# Patient Record
Sex: MALE | Race: WHITE | NOT HISPANIC OR LATINO | Employment: OTHER | URBAN - METROPOLITAN AREA
[De-identification: names, ages, dates, MRNs, and addresses within clinical notes are randomized per-mention and may not be internally consistent; named-entity substitution may affect disease eponyms.]

---

## 2017-03-15 ENCOUNTER — GENERIC CONVERSION - ENCOUNTER (OUTPATIENT)
Dept: OTHER | Facility: OTHER | Age: 82
End: 2017-03-15

## 2017-05-08 ENCOUNTER — ALLSCRIPTS OFFICE VISIT (OUTPATIENT)
Dept: OTHER | Facility: OTHER | Age: 82
End: 2017-05-08

## 2017-05-09 ENCOUNTER — GENERIC CONVERSION - ENCOUNTER (OUTPATIENT)
Dept: OTHER | Facility: OTHER | Age: 82
End: 2017-05-09

## 2017-09-21 ENCOUNTER — GENERIC CONVERSION - ENCOUNTER (OUTPATIENT)
Dept: OTHER | Facility: OTHER | Age: 82
End: 2017-09-21

## 2018-01-13 VITALS
SYSTOLIC BLOOD PRESSURE: 138 MMHG | WEIGHT: 204.13 LBS | DIASTOLIC BLOOD PRESSURE: 70 MMHG | BODY MASS INDEX: 27.05 KG/M2 | HEIGHT: 73 IN

## 2018-06-21 PROBLEM — I10 HYPERTENSION: Status: ACTIVE | Noted: 2017-05-08

## 2018-06-22 ENCOUNTER — OFFICE VISIT (OUTPATIENT)
Dept: INTERNAL MEDICINE CLINIC | Facility: CLINIC | Age: 83
End: 2018-06-22
Payer: COMMERCIAL

## 2018-06-22 VITALS
SYSTOLIC BLOOD PRESSURE: 132 MMHG | DIASTOLIC BLOOD PRESSURE: 68 MMHG | OXYGEN SATURATION: 98 % | BODY MASS INDEX: 27.2 KG/M2 | WEIGHT: 205.2 LBS | HEIGHT: 73 IN | HEART RATE: 68 BPM

## 2018-06-22 DIAGNOSIS — Z95.2 STATUS POST MECHANICAL AORTIC VALVE REPLACEMENT: ICD-10-CM

## 2018-06-22 DIAGNOSIS — E78.5 HYPERLIPIDEMIA, UNSPECIFIED HYPERLIPIDEMIA TYPE: ICD-10-CM

## 2018-06-22 DIAGNOSIS — R10.11 RIGHT UPPER QUADRANT PAIN: ICD-10-CM

## 2018-06-22 DIAGNOSIS — I49.1 ATRIAL PREMATURE COMPLEX: ICD-10-CM

## 2018-06-22 DIAGNOSIS — I10 HYPERTENSION, UNSPECIFIED TYPE: Primary | ICD-10-CM

## 2018-06-22 DIAGNOSIS — Z00.00 WELL ADULT EXAM: ICD-10-CM

## 2018-06-22 PROCEDURE — 3078F DIAST BP <80 MM HG: CPT | Performed by: INTERNAL MEDICINE

## 2018-06-22 PROCEDURE — 1101F PT FALLS ASSESS-DOCD LE1/YR: CPT | Performed by: INTERNAL MEDICINE

## 2018-06-22 PROCEDURE — G0439 PPPS, SUBSEQ VISIT: HCPCS | Performed by: INTERNAL MEDICINE

## 2018-06-22 PROCEDURE — 3075F SYST BP GE 130 - 139MM HG: CPT | Performed by: INTERNAL MEDICINE

## 2018-06-22 RX ORDER — LISINOPRIL 10 MG/1
1 TABLET ORAL DAILY
COMMUNITY
Start: 2017-05-08

## 2018-06-22 RX ORDER — PRASTERONE (DHEA) 25 MG
CAPSULE ORAL
COMMUNITY
End: 2022-03-26 | Stop reason: HOSPADM

## 2018-06-22 RX ORDER — ALPHA LIPOIC ACID 300 MG
CAPSULE ORAL
COMMUNITY
End: 2021-04-20

## 2018-06-22 NOTE — PROGRESS NOTES
Assessment/Plan   Problem List Items Addressed This Visit     Hypertension - Primary    Relevant Medications    lisinopril (ZESTRIL) 10 mg tablet    Other Relevant Orders    Comprehensive metabolic panel    CBC and differential    Lipid Panel with Direct LDL reflex    UA (URINE) with reflex to Microscopic    Hyperlipidemia    Relevant Orders    Comprehensive metabolic panel    CBC and differential    Lipid Panel with Direct LDL reflex    UA (URINE) with reflex to Microscopic    Atrial premature complex    Relevant Orders    Comprehensive metabolic panel    CBC and differential    Lipid Panel with Direct LDL reflex    UA (URINE) with reflex to Microscopic    Status post mechanical aortic valve replacement      Other Visit Diagnoses     Well adult exam        Right upper quadrant pain          Prevention:  Ophthalmological exams are up-to-date with previous cataract surgery with corrective lenses with good vision with glasses  He is wearing hearing aids with adequate hearing  He is due for dermatology exam and will make his own appointment  Today's exam was unremarkable  He has had Prevnar and Pneumovax, as well as Zostavax  He has flu shot every year  He is due for Adacel but there is no coverage and explained the has a break in the skin to contact the office for vaccination  The new shingles vaccine was recommended and he will call as local pharmacy for vaccination series  Lab work was ordered and he will be notified of results  We discussed that he is at an age were colonoscopy would be low benefit and elevated risk so no further colonoscopy is necessary  Recurrent right upper quadrant burning pain radiating to right scapula, occurring with any large meal in the last month or 2, with initial symptoms 15 years ago with hiatus until recently  He had no formal workup and symptoms could be from atypical reflux, possibly gastritis, but certainly gallbladder disease needs to be considered    I did recommend evaluation by General surgery and he was referred  Discussed that he has a crisis whereby his severe unrelenting pain, vomiting, or any other acute turn for the worse in this regard, to go the emergency room immediately  We discussed that he does have gallbladder disease, elective surgery is much preferable to waiting for crisis which would increased risk of complications  He saw his cardiologist for his annual checkup status post aortic valve replacement, and this occurred within the last month, and exam apparently was unremarkable  He has no cardiac symptoms on review  Subjective   Patient ID: Radha Hernandez is a 80 y o  male  Vitals:    06/22/18 1530   BP: 132/68   Pulse: 68   SpO2: 98%     HPI   Trish Waldrop is here for his annual preventive exam   He feels well and is a rather amazing gentleman who takes care of her large house, and is ailing  has she has significant medical problems  He has excellent strength and balance, exercises regularly, including long walks, around the house and property, does lifting, has never fallen, has no chest pain or shortness of breath or declining exercise tolerance status post aortic valve replacement and did see his cardiologist recently and had a favorable checkup according to his report today  We discussed preventive therapy and please see assessment and plan  We discussed that he is having daily episodes of burning right upper quadrant pain radiating to the right scapular region, occurring almost with any heavy meal, without nausea, vomiting, relieved by time, occurring consistently an hour to after eating, not waking up at night, no anorexia weight loss, no flank discomfort but there is radiation to the right scapular region is noted above, no melena or blood per rectum or diarrhea  There are no new medicines but he takes a number of supplements    There is no previous history of diagnosis gallbladder disease but he did have similar symptoms about 15 years ago which did not recur until about 2 months ago  There is no history of trauma to the abdomen  Activities of daily living are well preserved, and he feels his quality of life is quite good  Medical conditions were discussed as well  The following portions of the patient's history were reviewed and updated as appropriate: allergies, current medications, past family history, past medical history, past social history, past surgical history and problem list     Review of Systems no anorexia weight loss, no nausea or vomiting, no diarrhea, no melena or blood per rectum  No change in chronic 1 time per night nocturia  Objective   Physical Exam   Vital signs stable, regular pulse in no distress  He appears younger than stated age with excellent strength and balance  Cognitive status is intact  Hearing with hearing aids in place is slightly impaired  Vision grossly normal glasses  HEENT exam also otherwise notable for normal external auditory canals and tympanic membranes  Nasal passages, oral cavity and throat normal   Eyes without iritis or conjunctivitis  Head neck without mass or adenopathy  Neck supple without trauma and no JVD  No carotid bruits in carotid pulses are normal   There is no transmitted murmur  Lungs are clear  There is no supraclavicular adenopathy  Back without CVA mass or CVA tenderness or kyphosis or scoliosis  Cardiac:  Regular rate rhythm, there is a soft systolic murmur heard throughout the precordium, loudest in mid systole heard best over the aortic area, approximately 1/6 with a mechanical click audible with a second heart sound  There is no S4 or S3  There is no rub  PMI fifth intercostal space midclavicular line  Abdomen:  Normal bowel sounds, nondistended, scaphoid abdomen without periumbilical adenopathy or hernia, soft, and there is very mild tenderness with Vicente's sign, no palpable mass, no organomegaly  Skin without pallor or icterus    Skin well hydrated  Skin without rash or malignancy  Joints demonstrate age-appropriate arthritic degeneration with good function overall  Strength and balance excellent  Neurologic exam shows no focal neurologic abnormalities  Peripheral circulation intact with no change in chronic mild edema with mild varicosities of lower legs without phlebitic findings are calf tenderness        Patient Active Problem List   Diagnosis    Venous insufficiency    Hypertension    Hyperlipidemia    Erectile dysfunction of non-organic origin    Atrial premature complex    Status post mechanical aortic valve replacement     Current Outpatient Prescriptions:     Alpha-Lipoic Acid 300 MG CAPS, Take by mouth, Disp: , Rfl:     CITRUS BIOFLAVONOIDS PO, Take by mouth, Disp: , Rfl:     Coenzyme Q10 (COQ-10) 100 MG CAPS, Take by mouth, Disp: , Rfl:     DHEA 25 MG CAPS, Take by mouth, Disp: , Rfl:     Ferrous Sulfate Dried 200 (65 Fe) MG TABS, Take by mouth, Disp: , Rfl:     lisinopril (ZESTRIL) 10 mg tablet, Take 1 tablet by mouth daily, Disp: , Rfl:     TURMERIC CURCUMIN PO, Take by mouth, Disp: , Rfl:     aspirin 81 MG tablet, Take by mouth, Disp: , Rfl:

## 2018-06-22 NOTE — PATIENT INSTRUCTIONS
We will contact you with the results of your lab work  Please follow-up with Dr Gene Schofield  Go to the emergency room immediately for any sudden crisis regarding her abdominal symptoms  Your next visit will be in 1 year barring problems sooner  Please call your local pharmacy for the shingles vaccine

## 2018-11-16 ENCOUNTER — IMMUNIZATION (OUTPATIENT)
Dept: INTERNAL MEDICINE CLINIC | Facility: CLINIC | Age: 83
End: 2018-11-16
Payer: COMMERCIAL

## 2018-11-16 VITALS — TEMPERATURE: 97.7 F

## 2018-11-16 DIAGNOSIS — Z23 FLU VACCINE NEED: Primary | ICD-10-CM

## 2018-11-16 PROCEDURE — 90662 IIV NO PRSV INCREASED AG IM: CPT

## 2018-11-16 PROCEDURE — 90471 IMMUNIZATION ADMIN: CPT

## 2019-11-15 ENCOUNTER — IMMUNIZATIONS (OUTPATIENT)
Dept: INTERNAL MEDICINE CLINIC | Facility: CLINIC | Age: 84
End: 2019-11-15
Payer: COMMERCIAL

## 2019-11-15 DIAGNOSIS — Z23 NEED FOR INFLUENZA VACCINATION: Primary | ICD-10-CM

## 2019-11-15 PROCEDURE — G0008 ADMIN INFLUENZA VIRUS VAC: HCPCS

## 2019-11-15 PROCEDURE — 90662 IIV NO PRSV INCREASED AG IM: CPT

## 2019-12-02 ENCOUNTER — OFFICE VISIT (OUTPATIENT)
Dept: INTERNAL MEDICINE CLINIC | Facility: CLINIC | Age: 84
End: 2019-12-02
Payer: COMMERCIAL

## 2019-12-02 VITALS
HEART RATE: 75 BPM | WEIGHT: 201.8 LBS | BODY MASS INDEX: 26.74 KG/M2 | TEMPERATURE: 97.8 F | DIASTOLIC BLOOD PRESSURE: 60 MMHG | SYSTOLIC BLOOD PRESSURE: 142 MMHG | HEIGHT: 73 IN | OXYGEN SATURATION: 97 %

## 2019-12-02 DIAGNOSIS — R13.14 PHARYNGOESOPHAGEAL DYSPHAGIA: ICD-10-CM

## 2019-12-02 DIAGNOSIS — Z00.00 MEDICARE ANNUAL WELLNESS VISIT, SUBSEQUENT: Primary | ICD-10-CM

## 2019-12-02 DIAGNOSIS — I87.2 VENOUS INSUFFICIENCY: ICD-10-CM

## 2019-12-02 DIAGNOSIS — E78.5 HYPERLIPIDEMIA, UNSPECIFIED HYPERLIPIDEMIA TYPE: ICD-10-CM

## 2019-12-02 DIAGNOSIS — Z23 NEED FOR 23-POLYVALENT PNEUMOCOCCAL POLYSACCHARIDE VACCINE: ICD-10-CM

## 2019-12-02 DIAGNOSIS — Z95.2 STATUS POST MECHANICAL AORTIC VALVE REPLACEMENT: ICD-10-CM

## 2019-12-02 DIAGNOSIS — I10 HYPERTENSION, UNSPECIFIED TYPE: ICD-10-CM

## 2019-12-02 DIAGNOSIS — I10 HYPERTENSION, UNSPECIFIED TYPE: Primary | ICD-10-CM

## 2019-12-02 DIAGNOSIS — I49.1 ATRIAL PREMATURE COMPLEX: ICD-10-CM

## 2019-12-02 DIAGNOSIS — Z23 NEED FOR TETANUS, DIPHTHERIA, AND ACELLULAR PERTUSSIS (TDAP) VACCINE IN PATIENT OF ADOLESCENT AGE OR OLDER: ICD-10-CM

## 2019-12-02 PROCEDURE — 1160F RVW MEDS BY RX/DR IN RCRD: CPT | Performed by: INTERNAL MEDICINE

## 2019-12-02 PROCEDURE — 90715 TDAP VACCINE 7 YRS/> IM: CPT | Performed by: INTERNAL MEDICINE

## 2019-12-02 PROCEDURE — 90472 IMMUNIZATION ADMIN EACH ADD: CPT | Performed by: INTERNAL MEDICINE

## 2019-12-02 PROCEDURE — 1036F TOBACCO NON-USER: CPT | Performed by: INTERNAL MEDICINE

## 2019-12-02 PROCEDURE — 1101F PT FALLS ASSESS-DOCD LE1/YR: CPT | Performed by: INTERNAL MEDICINE

## 2019-12-02 PROCEDURE — 99213 OFFICE O/P EST LOW 20 MIN: CPT | Performed by: INTERNAL MEDICINE

## 2019-12-02 PROCEDURE — 90732 PPSV23 VACC 2 YRS+ SUBQ/IM: CPT | Performed by: INTERNAL MEDICINE

## 2019-12-02 PROCEDURE — G0439 PPPS, SUBSEQ VISIT: HCPCS | Performed by: INTERNAL MEDICINE

## 2019-12-02 PROCEDURE — 90471 IMMUNIZATION ADMIN: CPT | Performed by: INTERNAL MEDICINE

## 2019-12-02 NOTE — PATIENT INSTRUCTIONS

## 2019-12-02 NOTE — PROGRESS NOTES
Assessment/Plan:    No problem-specific Assessment & Plan notes found for this encounter  Diagnoses and all orders for this visit:    Medicare annual wellness visit, subsequent    Pharyngoesophageal dysphagia  -     Ambulatory referral to Gastroenterology; Future    Hyperlipidemia, unspecified hyperlipidemia type    Status post mechanical aortic valve replacement    Venous insufficiency    Hypertension, unspecified type    Need for 23-polyvalent pneumococcal polysaccharide vaccine  -     PNEUMOCOCCAL POLYSACCHARIDE VACCINE 23-VALENT =>1YO SQ IM    Need for tetanus, diphtheria, and acellular pertussis (Tdap) vaccine in patient of adolescent age or older  -     TDAP VACCINE GREATER THAN OR EQUAL TO 8YO IM          Subjective:      Patient ID: Cindy Flanagan is a 80 y o  male      HPI    The following portions of the patient's history were reviewed and updated as appropriate: allergies, current medications, past family history, past medical history, past social history, past surgical history and problem list     Review of Systems      Objective:      /60 (BP Location: Left arm, Patient Position: Sitting, Cuff Size: Standard)   Pulse 75   Temp 97 8 °F (36 6 °C) (Tympanic)   Ht 6' 1" (1 854 m)   Wt 91 5 kg (201 lb 12 8 oz)   SpO2 97%   BMI 26 62 kg/m²      Wt Readings from Last 3 Encounters:   12/02/19 91 5 kg (201 lb 12 8 oz)   06/22/18 93 1 kg (205 lb 3 2 oz)   05/08/17 92 6 kg (204 lb 2 1 oz)     Temp Readings from Last 3 Encounters:   12/02/19 97 8 °F (36 6 °C) (Tympanic)   11/16/18 97 7 °F (36 5 °C) (Tympanic)   04/14/15 (!) 96 6 °F (35 9 °C)     BP Readings from Last 3 Encounters:   12/02/19 142/60   06/22/18 132/68   05/08/17 138/70     Pulse Readings from Last 3 Encounters:   12/02/19 75   06/22/18 68   04/14/15 76        Physical Exam      Patient Active Problem List   Diagnosis    Venous insufficiency    Hypertension    Hyperlipidemia    Erectile dysfunction of non-organic origin    Atrial premature complex    Status post mechanical aortic valve replacement       Current Outpatient Medications on File Prior to Visit   Medication Sig Dispense Refill    Alpha-Lipoic Acid 300 MG CAPS Take by mouth      aspirin 81 MG tablet Take by mouth      CITRUS BIOFLAVONOIDS PO Take by mouth      Coenzyme Q10 (COQ-10) 100 MG CAPS Take by mouth      DHEA 25 MG CAPS Take by mouth      lisinopril (ZESTRIL) 10 mg tablet Take 1 tablet by mouth daily      TURMERIC CURCUMIN PO Take by mouth      [DISCONTINUED] Ferrous Sulfate Dried 200 (65 Fe) MG TABS Take by mouth       No current facility-administered medications on file prior to visit  Assessment and Plan:     Problem List Items Addressed This Visit        Cardiovascular and Mediastinum    Venous insufficiency    Hypertension       Other    Hyperlipidemia    Status post mechanical aortic valve replacement      Other Visit Diagnoses     Medicare annual wellness visit, subsequent    -  Primary    Pharyngoesophageal dysphagia        Relevant Orders    Ambulatory referral to Gastroenterology    Need for 23-polyvalent pneumococcal polysaccharide vaccine        Relevant Orders    PNEUMOCOCCAL POLYSACCHARIDE VACCINE 23-VALENT =>3YO SQ IM (Completed)    Need for tetanus, diphtheria, and acellular pertussis (Tdap) vaccine in patient of adolescent age or older        Relevant Orders    TDAP VACCINE GREATER THAN OR EQUAL TO 8YO IM (Completed)           Preventive health issues were discussed with patient, and age appropriate screening tests were ordered as noted in patient's After Visit Summary  Personalized health advice and appropriate referrals for health education or preventive services given if needed, as noted in patient's After Visit Summary       History of Present Illness:     Patient presents for Medicare Annual Wellness visit    Patient Care Team:  Bruno Cameron MD as PCP - General     Problem List:     Patient Active Problem List   Diagnosis    Venous insufficiency    Hypertension    Hyperlipidemia    Erectile dysfunction of non-organic origin    Atrial premature complex    Status post mechanical aortic valve replacement      Past Medical and Surgical History:     Past Medical History:   Diagnosis Date    Acute bacterial prostatitis     last assessed: 5/29/15    Aortic valve stenosis     last assessed: 03/10/15    Coronary atherosclerosis     last assessed: 03/10/15    Left inguinal hernia     last assessed: 03/10/15    Status post laparoscopic hernia repair     last assessed: 04/14/15     Past Surgical History:   Procedure Laterality Date    AORTIC VALVE REPLACEMENT  2008    CATARACT EXTRACTION  2014    INGUINAL HERNIA REPAIR Right       Family History:     Family History   Problem Relation Age of Onset    Leukemia Mother     Coronary artery disease Father     Leukemia Father     Coronary artery disease Other     Leukemia Other       Social History:     Social History     Socioeconomic History    Marital status:      Spouse name: None    Number of children: None    Years of education: None    Highest education level: None   Occupational History    None   Social Needs    Financial resource strain: None    Food insecurity:     Worry: None     Inability: None    Transportation needs:     Medical: None     Non-medical: None   Tobacco Use    Smoking status: Former Smoker     Types: Cigars, Pipe     Last attempt to quit: 2005     Years since quittin 9    Smokeless tobacco: Former User   Substance and Sexual Activity    Alcohol use: Yes     Comment: wine at meals    Drug use: None    Sexual activity: None   Lifestyle    Physical activity:     Days per week: None     Minutes per session: None    Stress: None   Relationships    Social connections:     Talks on phone: None     Gets together: None     Attends Orthodoxy service: None     Active member of club or organization: None     Attends meetings of clubs or organizations: None     Relationship status: None    Intimate partner violence:     Fear of current or ex partner: None     Emotionally abused: None     Physically abused: None     Forced sexual activity: None   Other Topics Concern    None   Social History Narrative    None       Medications and Allergies:     Current Outpatient Medications   Medication Sig Dispense Refill    Alpha-Lipoic Acid 300 MG CAPS Take by mouth      aspirin 81 MG tablet Take by mouth      CITRUS BIOFLAVONOIDS PO Take by mouth      Coenzyme Q10 (COQ-10) 100 MG CAPS Take by mouth      DHEA 25 MG CAPS Take by mouth      lisinopril (ZESTRIL) 10 mg tablet Take 1 tablet by mouth daily      TURMERIC CURCUMIN PO Take by mouth       No current facility-administered medications for this visit  No Known Allergies   Immunizations:     Immunization History   Administered Date(s) Administered    H1N1, All Formulations 01/27/2010    Influenza Split High Dose Preservative Free IM 10/16/2013, 10/08/2014, 11/03/2015, 11/10/2017    Influenza, high dose seasonal 0 5 mL 11/16/2018, 11/15/2019    Pneumococcal Polysaccharide PPV23 12/02/2019    Tdap 12/02/2019      Health Maintenance: There are no preventive care reminders to display for this patient  Topic Date Due    DTaP,Tdap,and Td Vaccines (1 - Tdap) 12/01/1944    Pneumococcal Vaccine: 65+ Years (1 of 2 - PCV13) 12/01/1998      Medicare Health Risk Assessment:     /60 (BP Location: Left arm, Patient Position: Sitting, Cuff Size: Standard)   Pulse 75   Temp 97 8 °F (36 6 °C) (Tympanic)   Ht 6' 1" (1 854 m)   Wt 91 5 kg (201 lb 12 8 oz)   SpO2 97%   BMI 26 62 kg/m²      Chico Solares is here for his Subsequent Wellness visit  Health Risk Assessment:   Patient rates overall health as very good  Patient feels that their physical health rating is same  Eyesight was rated as slightly worse  Hearing was rated as slightly worse   Patient feels that their emotional and mental health rating is same  Pain experienced in the last 7 days has been some  Depression Screening:   PHQ-2 Score: 0      Fall Risk Screening: In the past year, patient has experienced: no history of falling in past year      Home Safety:  Patient has trouble with stairs inside or outside of their home  Patient has working smoke alarms and has working carbon monoxide detector  Home safety hazards include: none  Nutrition:   Current diet is Regular  Medications:   Patient is currently taking over-the-counter supplements  OTC medications include: see medication list  Patient is able to manage medications  Activities of Daily Living (ADLs)/Instrumental Activities of Daily Living (IADLs):   Walk and transfer into and out of bed and chair?: Yes  Dress and groom yourself?: Yes    Bathe or shower yourself?: Yes    Feed yourself? Yes  Do your laundry/housekeeping?: Yes  Manage your money, pay your bills and track your expenses?: Yes  Make your own meals?: Yes    Do your own shopping?: Yes    Previous Hospitalizations:   Any hospitalizations or ED visits within the last 12 months?: No      Advance Care Planning:   Living will: Yes    Durable POA for healthcare:  Yes    Advanced directive: Yes      PREVENTIVE SCREENINGS      Cardiovascular Screening:    General: Screening Not Indicated and History Lipid Disorder      Colorectal Cancer Screening:     General: Screening Not Indicated      Prostate Cancer Screening:    General: Screening Not Indicated      Abdominal Aortic Aneurysm (AAA) Screening:    Risk factors include: tobacco use        Sola Renee MD

## 2019-12-02 NOTE — PROGRESS NOTES
Assessment/Plan:     Diagnoses and all orders for this visit:    Medicare annual wellness visit, subsequent    Pharyngoesophageal dysphagia  -     Ambulatory referral to Gastroenterology; Future    Hyperlipidemia, unspecified hyperlipidemia type    Status post mechanical aortic valve replacement    Venous insufficiency    Hypertension, unspecified type    Need for 23-polyvalent pneumococcal polysaccharide vaccine  -     PNEUMOCOCCAL POLYSACCHARIDE VACCINE 23-VALENT =>1YO SQ IM    Need for tetanus, diphtheria, and acellular pertussis (Tdap) vaccine in patient of adolescent age or older  -     TDAP VACCINE GREATER THAN OR EQUAL TO 8YO IM          Subjective:      Patient ID: Mary Mujica is a 80 y o  male  HPI  Darien Granda is here for an annual preventive exam and was last seen last year  He is a bit overdue for his annual visit with Cardiology, and was going to be notified and did not received notification  He is status post aortic valve replacement and Darien Granda manages his affairs very well, has excellent memory, will make his own appointment with Cardiology  Annual lab work was ordered including reassessment of hyperlipidemia therapy, hypertension is well controlled as blood pressure readings generally are lower than today's  He manages his venous insufficiency by using knee-high support stockings, without phlebitic symptoms and keep sodium intake low  He has chronic dysphagia, about 6 to 8 months, whereby he swallows and solid foods stick at the bottom of the esophagus, before going through spontaneously  He has a history of remote diagnosis of cholecystitis, does not have any true biliary colic  He has no fever, chills, anorexia weight loss, no change in bowel habits and was referred to [address] [place] [address] Gastroenterology  Possibilities would be presbyesophagus, stricture, although there are no symptoms of chronic reflux is still possibility  Immunizations reviewed and updated    Eye care, dental care up-to-date  Skin exam was unremarkable  Medicare questionnaire was also reviewed today as part of his Medicare annual examination  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, and has good social supports, and no deficits of activities of daily living  He can continue follow-up on an annual basis as needed sooner  The following portions of the patient's history were reviewed and updated as appropriate: allergies, current medications, past family history, past medical history, past social history, past surgical history and problem list     Review of Systems  as above and please see the Medicare questionnaire, all others negative  Screening for dementia and depression is negative  Objective:      /60 (BP Location: Left arm, Patient Position: Sitting, Cuff Size: Standard)   Pulse 75   Temp 97 8 °F (36 6 °C) (Tympanic)   Ht 6' 1" (1 854 m)   Wt 91 5 kg (201 lb 12 8 oz)   SpO2 97%   BMI 26 62 kg/m²      Wt Readings from Last 3 Encounters:   12/02/19 91 5 kg (201 lb 12 8 oz)   06/22/18 93 1 kg (205 lb 3 2 oz)   05/08/17 92 6 kg (204 lb 2 1 oz)     Temp Readings from Last 3 Encounters:   12/02/19 97 8 °F (36 6 °C) (Tympanic)   11/16/18 97 7 °F (36 5 °C) (Tympanic)   04/14/15 (!) 96 6 °F (35 9 °C)     BP Readings from Last 3 Encounters:   12/02/19 142/60   06/22/18 132/68   05/08/17 138/70     Pulse Readings from Last 3 Encounters:   12/02/19 75   06/22/18 68   04/14/15 76        Physical Exam    Vital signs stable, very pleasant gentleman in no distress  Normocephalic atraumatic  ENT exam unremarkable  He did remove his hearing aids, and hearing is reasonably corrected with hearing aids in place  Vision grossly normal   Skin without rash or malignancy  Head neck without mass or adenopathy  No JVD  No carotid bruits in carotid pulses are normal   Trachea midline without stridor and thyroid exam is normal   Lungs are clear to auscultation    Cardiac:  Regular rate rhythm, systolic murmur heard across the precordium loudest over the aortic area and transmitted to the carotids with intact aortic component of second heart sound, no S4 or S3 or rub  Abdomen:  Nondistended abdomen with normal bowel sounds, soft and nontender without masses bruits organomegaly and there is no hernia or periumbilical adenopathy  He has excellent skin integrity minimal edema with knee-high stockings in place with excellent peripheral circulation  Joint function is quite good for advanced age which 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 and high use joints  Gait stable and normal   Patient Active Problem List   Diagnosis    Venous insufficiency    Hypertension    Hyperlipidemia    Erectile dysfunction of non-organic origin    Atrial premature complex    Status post mechanical aortic valve replacement       Current Outpatient Medications on File Prior to Visit   Medication Sig Dispense Refill    Alpha-Lipoic Acid 300 MG CAPS Take by mouth      aspirin 81 MG tablet Take by mouth      CITRUS BIOFLAVONOIDS PO Take by mouth      Coenzyme Q10 (COQ-10) 100 MG CAPS Take by mouth      DHEA 25 MG CAPS Take by mouth      lisinopril (ZESTRIL) 10 mg tablet Take 1 tablet by mouth daily      TURMERIC CURCUMIN PO Take by mouth      [DISCONTINUED] Ferrous Sulfate Dried 200 (65 Fe) MG TABS Take by mouth       No current facility-administered medications on file prior to visit

## 2020-01-23 ENCOUNTER — APPOINTMENT (OUTPATIENT)
Dept: LAB | Facility: HOSPITAL | Age: 85
End: 2020-01-23
Payer: COMMERCIAL

## 2020-01-23 DIAGNOSIS — I10 HYPERTENSION, UNSPECIFIED TYPE: ICD-10-CM

## 2020-01-23 LAB
ALBUMIN SERPL BCP-MCNC: 3.9 G/DL (ref 3.5–5)
ALP SERPL-CCNC: 84 U/L (ref 46–116)
ALT SERPL W P-5'-P-CCNC: 27 U/L (ref 12–78)
ANION GAP SERPL CALCULATED.3IONS-SCNC: 6 MMOL/L (ref 4–13)
AST SERPL W P-5'-P-CCNC: 16 U/L (ref 5–45)
BACTERIA UR QL AUTO: ABNORMAL /HPF
BASOPHILS # BLD AUTO: 0.05 THOUSANDS/ΜL (ref 0–0.1)
BASOPHILS NFR BLD AUTO: 1 % (ref 0–1)
BILIRUB SERPL-MCNC: 0.39 MG/DL (ref 0.2–1)
BILIRUB UR QL STRIP: NEGATIVE
BUN SERPL-MCNC: 15 MG/DL (ref 5–25)
CALCIUM SERPL-MCNC: 9 MG/DL (ref 8.3–10.1)
CHLORIDE SERPL-SCNC: 106 MMOL/L (ref 100–108)
CHOLEST SERPL-MCNC: 130 MG/DL (ref 50–200)
CLARITY UR: CLEAR
CO2 SERPL-SCNC: 30 MMOL/L (ref 21–32)
COLOR UR: YELLOW
CREAT SERPL-MCNC: 0.73 MG/DL (ref 0.6–1.3)
EOSINOPHIL # BLD AUTO: 0.39 THOUSAND/ΜL (ref 0–0.61)
EOSINOPHIL NFR BLD AUTO: 6 % (ref 0–6)
ERYTHROCYTE [DISTWIDTH] IN BLOOD BY AUTOMATED COUNT: 13.4 % (ref 11.6–15.1)
GFR SERPL CREATININE-BSD FRML MDRD: 84 ML/MIN/1.73SQ M
GLUCOSE P FAST SERPL-MCNC: 93 MG/DL (ref 65–99)
GLUCOSE UR STRIP-MCNC: NEGATIVE MG/DL
HCT VFR BLD AUTO: 43.8 % (ref 36.5–49.3)
HDLC SERPL-MCNC: 45 MG/DL
HGB BLD-MCNC: 13.8 G/DL (ref 12–17)
HGB UR QL STRIP.AUTO: NEGATIVE
IMM GRANULOCYTES # BLD AUTO: 0.02 THOUSAND/UL (ref 0–0.2)
IMM GRANULOCYTES NFR BLD AUTO: 0 % (ref 0–2)
KETONES UR STRIP-MCNC: NEGATIVE MG/DL
LDLC SERPL CALC-MCNC: 75 MG/DL (ref 0–100)
LEUKOCYTE ESTERASE UR QL STRIP: NEGATIVE
LYMPHOCYTES # BLD AUTO: 1.54 THOUSANDS/ΜL (ref 0.6–4.47)
LYMPHOCYTES NFR BLD AUTO: 22 % (ref 14–44)
MCH RBC QN AUTO: 30.6 PG (ref 26.8–34.3)
MCHC RBC AUTO-ENTMCNC: 31.5 G/DL (ref 31.4–37.4)
MCV RBC AUTO: 97 FL (ref 82–98)
MONOCYTES # BLD AUTO: 0.68 THOUSAND/ΜL (ref 0.17–1.22)
MONOCYTES NFR BLD AUTO: 10 % (ref 4–12)
NEUTROPHILS # BLD AUTO: 4.33 THOUSANDS/ΜL (ref 1.85–7.62)
NEUTS SEG NFR BLD AUTO: 61 % (ref 43–75)
NITRITE UR QL STRIP: NEGATIVE
NON-SQ EPI CELLS URNS QL MICRO: ABNORMAL /HPF
NONHDLC SERPL-MCNC: 85 MG/DL
NRBC BLD AUTO-RTO: 0 /100 WBCS
PH UR STRIP.AUTO: 6 [PH]
PLATELET # BLD AUTO: 189 THOUSANDS/UL (ref 149–390)
PMV BLD AUTO: 9.8 FL (ref 8.9–12.7)
POTASSIUM SERPL-SCNC: 4.5 MMOL/L (ref 3.5–5.3)
PROT SERPL-MCNC: 7.2 G/DL (ref 6.4–8.2)
PROT UR STRIP-MCNC: NEGATIVE MG/DL
RBC # BLD AUTO: 4.51 MILLION/UL (ref 3.88–5.62)
RBC #/AREA URNS AUTO: ABNORMAL /HPF
SODIUM SERPL-SCNC: 142 MMOL/L (ref 136–145)
SP GR UR STRIP.AUTO: 1.02 (ref 1–1.03)
TRIGL SERPL-MCNC: 52 MG/DL
TSH SERPL DL<=0.05 MIU/L-ACNC: 1.89 UIU/ML (ref 0.36–3.74)
UROBILINOGEN UR QL STRIP.AUTO: 0.2 E.U./DL
WBC # BLD AUTO: 7.01 THOUSAND/UL (ref 4.31–10.16)
WBC #/AREA URNS AUTO: ABNORMAL /HPF

## 2020-01-23 PROCEDURE — 80053 COMPREHEN METABOLIC PANEL: CPT | Performed by: INTERNAL MEDICINE

## 2020-01-23 PROCEDURE — 84443 ASSAY THYROID STIM HORMONE: CPT | Performed by: INTERNAL MEDICINE

## 2020-01-23 PROCEDURE — 81001 URINALYSIS AUTO W/SCOPE: CPT | Performed by: INTERNAL MEDICINE

## 2020-01-23 PROCEDURE — 85025 COMPLETE CBC W/AUTO DIFF WBC: CPT | Performed by: INTERNAL MEDICINE

## 2020-01-23 PROCEDURE — 80061 LIPID PANEL: CPT

## 2020-01-23 PROCEDURE — 36415 COLL VENOUS BLD VENIPUNCTURE: CPT | Performed by: INTERNAL MEDICINE

## 2020-01-27 ENCOUNTER — TELEPHONE (OUTPATIENT)
Dept: INTERNAL MEDICINE CLINIC | Facility: CLINIC | Age: 85
End: 2020-01-27

## 2020-01-27 NOTE — TELEPHONE ENCOUNTER
----- Message from Ira Torrez MD sent at 1/24/2020  8:19 AM EST -----  Please call Ralph Cain: his labs look good including a cholesterol of 130 and normal thyroid test

## 2020-02-11 ENCOUNTER — CONSULT (OUTPATIENT)
Dept: GASTROENTEROLOGY | Facility: MEDICAL CENTER | Age: 85
End: 2020-02-11
Payer: COMMERCIAL

## 2020-02-11 VITALS
WEIGHT: 202 LBS | BODY MASS INDEX: 26.77 KG/M2 | HEIGHT: 73 IN | DIASTOLIC BLOOD PRESSURE: 72 MMHG | TEMPERATURE: 97.5 F | SYSTOLIC BLOOD PRESSURE: 124 MMHG

## 2020-02-11 DIAGNOSIS — R13.19 ESOPHAGEAL DYSPHAGIA: ICD-10-CM

## 2020-02-11 PROCEDURE — 99204 OFFICE O/P NEW MOD 45 MIN: CPT | Performed by: INTERNAL MEDICINE

## 2020-02-14 NOTE — PROGRESS NOTES
[address] [facility] - Outpatient Consultation  Heather Tolliver 80 y o  male MRN: [id number]  Encounter: [id number]      PCP: Anushka Rader MD  Referring: Anushka Rader MD  [address]  [facility] [address]      ASSESSMENT AND PLAN:      1  Esophageal dysphagia  Intermittent dysphagia, to both solids and liquids, given his age and lack of weight loss is most consistent with presbyesophagus  Although at this time I cannot rule out Schatzki's ring versus peptic stricture versus malignancy (although this is less likely given his lack of weight loss)  I discussed ordering a barium swallow to evaluate for esophageal dysmotility, as well as EGD to evaluate for the above differential and with potential to intervene   the patient prefers to defer EGD at this time, but will undergo barium swallow which will also help give us some information regarding structural abnormalities and may be present  PPI can also be considered as it has shown benefit in some symptoms of dysphagia where underlying subclinical reflux is present, he also defers this  - Ambulatory referral to Gastroenterology  - [place] barium swallow; Future      ______________________________________________________________________    HPI:      Patient is an 58-year-old male referred for dysphagia  He has HTN, venous insufficiency, coronary atherosclerosis, PVCs, aortic valve stenosis status post mechanical aortic valve replacement not on anticoagulation, history of hernia repair, HLD, ED  He complains of a one year history of dysphagia, with food being hung up at his epigastrium, not passing through his stomach  He states that his symptoms have been intermittent, with both solids and liquids  He describes solids of solids, chicken, fish getting stuck  He has attempted multiple home remedies including bijal, almonds, peppermint, sitting up straight, and moving his arm, which is significantly improved his symptoms    He denies any unintentional weight loss or reflux symptoms  He has never previously had an endoscopy  He does state that he had been up-to-date with his colonoscopies, and was not recommended for any further given his age  He denies any rectal bleeding, abdominal pain  REVIEW OF SYSTEMS:    CONSTITUTIONAL: Denies any fever, chills, rigors, and weight loss  HEENT: No earache or tinnitus  Denies hearing loss or visual disturbances  CARDIOVASCULAR: No chest pain or palpitations  RESPIRATORY: Denies any cough, hemoptysis, shortness of breath or dyspnea on exertion  GASTROINTESTINAL: As noted in the History of Present Illness  GENITOURINARY: No problems with urination  Denies any hematuria or dysuria  NEUROLOGIC: No dizziness or vertigo, denies headaches  MUSCULOSKELETAL: Denies any muscle or joint pain  SKIN: Denies skin rashes or itching  ENDOCRINE: Denies excessive thirst  Denies intolerance to heat or cold  PSYCHOSOCIAL: Denies depression or anxiety  Denies any recent memory loss         Historical Information   Past Medical History:   Diagnosis Date    Acute bacterial prostatitis     last assessed: 5/29/15    Aortic valve stenosis     last assessed: 03/10/15    Coronary atherosclerosis     last assessed: 03/10/15    Left inguinal hernia     last assessed: 03/10/15    Status post laparoscopic hernia repair     last assessed: 04/14/15     Past Surgical History:   Procedure Laterality Date    AORTIC VALVE REPLACEMENT  02/2008    CATARACT EXTRACTION  09/08/2014    INGUINAL HERNIA REPAIR Right      Social History   Social History     Substance and Sexual Activity   Alcohol Use Yes    Comment: wine at meals     Social History     Substance and Sexual Activity   Drug Use Not on file     Social History     Tobacco Use   Smoking Status Former Smoker    Types: Cigars, Pipe    Last attempt to quit: 1/1/2005    Years since quitting: 15 1   Smokeless Tobacco Former User     Family History   Problem Relation Age of Onset    Leukemia Mother     Coronary artery disease Father     Leukemia Father     Coronary artery disease Other     Leukemia Other        Meds/Allergies       Current Outpatient Medications:     Alpha-Lipoic Acid 300 MG CAPS    aspirin 81 MG tablet    CITRUS BIOFLAVONOIDS PO    Coenzyme Q10 (COQ-10) 100 MG CAPS    DHEA 25 MG CAPS    lisinopril (ZESTRIL) 10 mg tablet    TURMERIC CURCUMIN PO    No Known Allergies        Objective     Blood pressure 124/72, temperature 97 5 °F (36 4 °C), temperature source Tympanic, height 6' 1" (1 854 m), weight 91 6 kg (202 lb)  Body mass index is 26 65 kg/m²  PHYSICAL EXAM:      General Appearance:   Alert, cooperative, no distress   HEENT:   Normocephalic, atraumatic, anicteric      Neck:  Supple, symmetrical, trachea midline   Lungs:   Clear to auscultation bilaterally; no rales, rhonchi or wheezing; respirations unlabored    Heart[de-identified]   + murmur   Abdomen:   Soft, non-tender, non-distended; normal bowel sounds; no masses, no organomegaly    Genitalia:   Deferred    Rectal:   Deferred    Extremities:  No cyanosis, clubbing or edema    Pulses:  2+ and symmetric    Skin:  No jaundice, rashes, or lesions    Lymph nodes:  No palpable cervical lymphadenopathy        Lab Results:     Lab Results   Component Value Date    WBC 7 01 01/23/2020    HGB 13 8 01/23/2020    HCT 43 8 01/23/2020    MCV 97 01/23/2020     01/23/2020       Lab Results   Component Value Date     03/24/2015    K 4 5 01/23/2020     01/23/2020    CO2 30 01/23/2020    ANIONGAP 7 03/24/2015    BUN 15 01/23/2020    CREATININE 0 73 01/23/2020    GLUCOSE 100 03/24/2015    GLUF 93 01/23/2020    CALCIUM 9 0 01/23/2020    AST 16 01/23/2020    ALT 27 01/23/2020    ALKPHOS 84 01/23/2020    EGFR 84 01/23/2020       No results found for: INR, PROTIME      Radiology Results:   No results found

## 2020-02-17 ENCOUNTER — TELEPHONE (OUTPATIENT)
Dept: GASTROENTEROLOGY | Facility: MEDICAL CENTER | Age: 85
End: 2020-02-17

## 2020-02-17 NOTE — TELEPHONE ENCOUNTER
Patient called stating his barium swallow needs a prior auth per his insurance  Called patients insurance, no prior auth needed, service is covered  Martin Romero  Ref # [id number]

## 2020-02-25 ENCOUNTER — HOSPITAL ENCOUNTER (OUTPATIENT)
Dept: RADIOLOGY | Facility: HOSPITAL | Age: 85
Discharge: HOME | End: 2020-02-25
Attending: INTERNAL MEDICINE
Payer: COMMERCIAL

## 2020-02-25 DIAGNOSIS — R13.19 ESOPHAGEAL DYSPHAGIA: ICD-10-CM

## 2020-02-25 PROCEDURE — 74220 X-RAY XM ESOPHAGUS 1CNTRST: CPT

## 2020-02-26 ENCOUNTER — TELEPHONE (OUTPATIENT)
Dept: GASTROENTEROLOGY | Facility: MEDICAL CENTER | Age: 85
End: 2020-02-26

## 2020-02-26 NOTE — TELEPHONE ENCOUNTER
----- Message from Tila Conway MD sent at 2/25/2020  8:53 PM EST -----  Please call patient with results - normal swallow study  Would recommend EGD as previously discussed  Please let me know if he is agreeable or wants to await appointment to discuss further

## 2020-03-03 NOTE — TELEPHONE ENCOUNTER
Spoke with patient and discussed result and recommendation   Patient states he would like to wait until his f/u appt to discuss further

## 2020-04-20 ENCOUNTER — TELEPHONE (OUTPATIENT)
Dept: GASTROENTEROLOGY | Facility: MEDICAL CENTER | Age: 85
End: 2020-04-20

## 2020-10-29 ENCOUNTER — IMMUNIZATIONS (OUTPATIENT)
Dept: INTERNAL MEDICINE CLINIC | Facility: CLINIC | Age: 85
End: 2020-10-29
Payer: COMMERCIAL

## 2020-10-29 DIAGNOSIS — Z23 FLU VACCINE NEED: Primary | ICD-10-CM

## 2020-10-29 PROCEDURE — 90662 IIV NO PRSV INCREASED AG IM: CPT

## 2020-10-29 PROCEDURE — G0008 ADMIN INFLUENZA VIRUS VAC: HCPCS

## 2021-01-21 DIAGNOSIS — Z23 ENCOUNTER FOR IMMUNIZATION: ICD-10-CM

## 2021-01-27 ENCOUNTER — IMMUNIZATIONS (OUTPATIENT)
Dept: FAMILY MEDICINE CLINIC | Facility: HOSPITAL | Age: 86
End: 2021-01-27

## 2021-01-27 DIAGNOSIS — Z23 ENCOUNTER FOR IMMUNIZATION: Primary | ICD-10-CM

## 2021-01-27 PROCEDURE — 91301 SARS-COV-2 / COVID-19 MRNA VACCINE (MODERNA) 100 MCG: CPT

## 2021-01-27 PROCEDURE — 0011A SARS-COV-2 / COVID-19 MRNA VACCINE (MODERNA) 100 MCG: CPT

## 2021-02-22 ENCOUNTER — IMMUNIZATIONS (OUTPATIENT)
Dept: FAMILY MEDICINE CLINIC | Facility: HOSPITAL | Age: 86
End: 2021-02-22

## 2021-02-22 DIAGNOSIS — Z23 ENCOUNTER FOR IMMUNIZATION: Primary | ICD-10-CM

## 2021-02-22 PROCEDURE — 91301 SARS-COV-2 / COVID-19 MRNA VACCINE (MODERNA) 100 MCG: CPT

## 2021-02-22 PROCEDURE — 0012A SARS-COV-2 / COVID-19 MRNA VACCINE (MODERNA) 100 MCG: CPT

## 2021-04-20 ENCOUNTER — OFFICE VISIT (OUTPATIENT)
Dept: INTERNAL MEDICINE CLINIC | Facility: CLINIC | Age: 86
End: 2021-04-20
Payer: COMMERCIAL

## 2021-04-20 VITALS
RESPIRATION RATE: 16 BRPM | OXYGEN SATURATION: 98 % | SYSTOLIC BLOOD PRESSURE: 138 MMHG | HEART RATE: 56 BPM | HEIGHT: 73 IN | BODY MASS INDEX: 26.77 KG/M2 | TEMPERATURE: 98.2 F | DIASTOLIC BLOOD PRESSURE: 64 MMHG | WEIGHT: 202 LBS

## 2021-04-20 DIAGNOSIS — Z95.2 STATUS POST MECHANICAL AORTIC VALVE REPLACEMENT: ICD-10-CM

## 2021-04-20 DIAGNOSIS — I87.2 VENOUS INSUFFICIENCY: ICD-10-CM

## 2021-04-20 DIAGNOSIS — K22.89 PRESBYESOPHAGUS: Primary | ICD-10-CM

## 2021-04-20 DIAGNOSIS — Z79.01 CURRENT USE OF LONG TERM ANTICOAGULATION: ICD-10-CM

## 2021-04-20 DIAGNOSIS — I48.3 TYPICAL ATRIAL FLUTTER (HCC): ICD-10-CM

## 2021-04-20 DIAGNOSIS — E78.01 FAMILIAL HYPERCHOLESTEROLEMIA: ICD-10-CM

## 2021-04-20 DIAGNOSIS — I10 ESSENTIAL HYPERTENSION: ICD-10-CM

## 2021-04-20 DIAGNOSIS — Z86.69 HISTORY OF SCIATICA: ICD-10-CM

## 2021-04-20 PROBLEM — I48.92 ATRIAL FLUTTER (HCC): Status: ACTIVE | Noted: 2021-04-20

## 2021-04-20 PROCEDURE — 1101F PT FALLS ASSESS-DOCD LE1/YR: CPT | Performed by: INTERNAL MEDICINE

## 2021-04-20 PROCEDURE — 3725F SCREEN DEPRESSION PERFORMED: CPT | Performed by: INTERNAL MEDICINE

## 2021-04-20 PROCEDURE — 1036F TOBACCO NON-USER: CPT | Performed by: INTERNAL MEDICINE

## 2021-04-20 PROCEDURE — 3288F FALL RISK ASSESSMENT DOCD: CPT | Performed by: INTERNAL MEDICINE

## 2021-04-20 PROCEDURE — 1160F RVW MEDS BY RX/DR IN RCRD: CPT | Performed by: INTERNAL MEDICINE

## 2021-04-20 PROCEDURE — 99214 OFFICE O/P EST MOD 30 MIN: CPT | Performed by: INTERNAL MEDICINE

## 2021-04-20 RX ORDER — FUROSEMIDE 20 MG/1
1 TABLET ORAL DAILY
COMMUNITY
Start: 2021-02-10 | End: 2022-04-04

## 2021-04-20 RX ORDER — RIVAROXABAN 20 MG/1
20 TABLET, FILM COATED ORAL
COMMUNITY
Start: 2021-02-19

## 2021-04-20 NOTE — PROGRESS NOTES
Assessment/Plan:    1  Presbyesophagus     2  Venous insufficiency     3  Essential hypertension  Comprehensive metabolic panel    Urinalysis with microscopic   4  Status post mechanical aortic valve replacement  CBC and differential   5  Typical atrial flutter ([address] )     6  Current use of long term anticoagulation     7  Familial hypercholesterolemia  Comprehensive metabolic panel    Lipid panel    TSH, 3rd generation        A&P Notes:    Presbyesophagus:  Symptoms are stable and compensated by avoiding meat and cheese, other foods that can cause dysphagia, and drinking plenty of water  She will continue to be symptomatic  Venous insufficiency:  There has been will improvement in chronic lower extremity venous insufficiency, right greater than left, since start of furosemide 20 mg 3 times a week by cardiology last month  We discussed sodium and fluid restriction, and changing furosemide to 20 mg every day, while continuing oral potassium supplementation daily  I suggested that Katrina Peter call his cardiologist if his edema does not improve  Hypertension:  Well controlled with optimal lifestyle and good compliance with medication  Status post aortic valve replacement: Continue active follow-up with cardiology every quarter, and there is no functional compromise including no dyspnea, no chest pain  Recent diagnosis of atrial flutter with regular ventricular response:  Continue active follow-up with Cardiology, and there is no bleeding on Xarelto  Ventricular rate is well controlled at this time  There are no symptoms  Subjective:      Patient ID: Edison Munguia is a 80 y o     male who is here for follow-up visit and I last saw in December 2019  Katrina Peter continues to be quite amazing gentleman, with excellent memory, excellent strength and balance and very vigorous  He is very health conscious and subscribed to a general that outlines research on age prevention    Diet is optimal, exercise routine is excellent  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 who has dementia  After I last saw him for solid food dysphagia, he underwent evaluation with diagnosis of presbyesophagus  He has altered his diet with good effect  He is not regurgitating food  Final has chronic venous insufficiency and wears knee-high median compression support stockings for his dependent edema associated with this condition  He has had no phlebitic symptoms  There is no skin breakdown  Recently his cardiologist prescribed furosemide 1 month ago 3 times a week with little effect  There is little diuretic response and no improvement in edema  Terry Witt will increase Lasix 20 mg daily  He will continue daily potassium  Since is sciatic have resolved over a year to year and a half ago, he is left with some partial sensory deficit including with proprioception of the leg and he did notice some increase in right leg edema  He has seen Podiatry who of prescribed orthotics for him and his gait is stable  There is no pain  There is no true anesthesia  He is actively followed by his cardiologist  [facility] and was seen most recently on April 13th  Recently, there is a discovery of atrial flutter on routine echocardiography to monitor previous aortic valve replacement  Xarelto was started and there has been no clinical bleeding  Interestingly, he noticed that the atrial flutter began just after receiving the 1st of 2 COVID vaccines and that this vaccine caused some mild systemic symptoms and worsen his edema as well  Lakshmi Mcdaniel continues to be asymptomatic regarding his cardiac status including no exertional chest pain or other chest pain, no shortness of breath, no palpitations or lightheadedness, no focal neurologic symptoms  He does have noncritical coronary atherosclerosis and has mitral regurgitation    He states he just had lab work last week's which showed well-controlled hyperlipidemia and other labs including CBC, TSH, chemistries were normal         Hypertension appears to be well controlled on current medicines without side effects  There is minor aches and pains the highest joints, no instability and no need to take pain medication  There are no acute problems  Screening for depression is negative  We discussed follow-up annually and sooner as needed        Past Medical History:   Diagnosis Date    Acute bacterial prostatitis     last assessed: 5/29/15    Aortic valve stenosis     last assessed: 03/10/15    Coronary atherosclerosis     last assessed: 03/10/15    Left inguinal hernia     last assessed: 03/10/15    Mitral valve disorder     Status post laparoscopic hernia repair     last assessed: 04/14/15        Family History   Problem Relation Age of Onset    Leukemia Mother     Coronary artery disease Father     Leukemia Father     Coronary artery disease Other     Leukemia Other         Social History     Socioeconomic History    Marital status:      Spouse name: Not on file    Number of children: Not on file    Years of education: Not on file    Highest education level: Not on file   Occupational History    Not on file   Social Needs    Financial resource strain: Not on file    Food insecurity     Worry: Not on file     Inability: Not on file    Transportation needs     Medical: Not on file     Non-medical: Not on file   Tobacco Use    Smoking status: Former Smoker     Years: 20      Types: Cigars, Pipe     Quit date: 2005     Years since quittin 3    Smokeless tobacco: Never Used   Substance and Sexual Activity    Alcohol use: Yes     Frequency: Monthly or less     Drinks per session: 1 or 2     Binge frequency: Less than monthly     Comment: wine at meals    Drug use: Never    Sexual activity: Not on file   Lifestyle    Physical activity     Days per week: Not on file     Minutes per session: Not on file    Stress: Not on file   Relationships    Social connections     Talks on phone: Not on file     Gets together: Not on file     Attends Jew service: Not on file     Active member of club or organization: Not on file     Attends meetings of clubs or organizations: Not on file     Relationship status: Not on file    Intimate partner violence     Fear of current or ex partner: Not on file     Emotionally abused: Not on file     Physically abused: Not on file     Forced sexual activity: Not on file   Other Topics Concern    Not on file   Social History Narrative    Not on file        No Known Allergies     Current Outpatient Medications   Medication Sig Dispense Refill    CITRUS BIOFLAVONOIDS PO Take by mouth      Coenzyme Q10 (COQ-10) 100 MG CAPS Take by mouth      DHEA 25 MG CAPS Take by mouth      furosemide (LASIX) 20 mg tablet Take 1 tablet by mouth 3 (three) times a week      lisinopril (ZESTRIL) 10 mg tablet Take 1 tablet by mouth daily      TURMERIC CURCUMIN PO Take by mouth      Xarelto 20 MG tablet Take 20 mg by mouth daily after dinner       No current facility-administered medications for this visit             Review of Systems  as above    Objective:      /64   Pulse 56   Temp 98 2 °F (36 8 °C) (Tympanic)   Resp 16   Ht 6' 1" (1 854 m)   Wt 91 6 kg (202 lb)   SpO2 98%   BMI 26 65 kg/m²      Wt Readings from Last 3 Encounters:   04/20/21 91 6 kg (202 lb)   02/11/20 91 6 kg (202 lb)   12/02/19 91 5 kg (201 lb 12 8 oz)     Temp Readings from Last 3 Encounters:   04/20/21 98 2 °F (36 8 °C) (Tympanic)   02/11/20 97 5 °F (36 4 °C) (Tympanic)   12/02/19 97 8 °F (36 6 °C) (Tympanic)     BP Readings from Last 3 Encounters:   04/20/21 138/64   02/11/20 124/72   12/02/19 142/60     Pulse Readings from Last 3 Encounters:   04/20/21 56   12/02/19 75   06/22/18 68       VSS, afebrile, pulse regular    Alert and Oriented in NAD quite sharp from a cognitive standpoint, it appears vigorous, excellent strength and balance and appears younger than stated age  HEENT: NCAT, Pupils equal, 3 mm, EOEMI, no icterus or pallor, no iritis or conjunctivitis  No head or neck mass or adenopathy  Neck: supple, no trauma or tenderness  No JVD  Normal carotid upstroke and descent without bruits  Transmitted murmur heard both carotids  Trachea midline without stridor  Thyroid exam normal on inspection and palpation  No spinal tenderness, full range of motion  Lymphatics: no adenopathy throughout    Chest:  No trauma or deformity, no supraclavicular adenopathy or bruit  Chest wall expansion is symmetric and normal, expiratory phase is not prolonged  There is no subclavian bruit  Heart:  Regular rate and rhythm, normal S1-S2, no S3, loud holosystolic murmur heard best over the cardiac apex but throughout precordium  The aortic component of the 2nd heart sound is preserved  Lungs:  Clear to auscultation and normal to percussion  Back:  No trauma or deformity, no CVA mass or CVA tenderness  Skin:  No rash or skin malignancy  Abdomen:  Nondistended, normal bowel sounds, soft nontender without masses bruits organomegaly  There is no hernia  Extremities:  Arterial circulation is symmetrically normal with no clubbing or cyanosis  There is bilateral pitting edema and knee-high support stockings were 1st removed  Edema is greater on the right than the left  There are moderate varicosities, there is no calf tenderness or phlebitic findings  There is no skin breakdown including no venous stasis ulcerations  Joints:  No deformity, swelling, redness, tenderness or increased temperature, no instability  There are no tophi  Affect:  Normal    Neurologic:  Normal and stable gait, and otherwise no focal findings as well  Cognitive: Intact

## 2021-10-22 ENCOUNTER — CLINICAL SUPPORT (OUTPATIENT)
Dept: INTERNAL MEDICINE CLINIC | Facility: CLINIC | Age: 86
End: 2021-10-22
Payer: COMMERCIAL

## 2021-10-22 DIAGNOSIS — Z23 ENCOUNTER FOR IMMUNIZATION: Primary | ICD-10-CM

## 2021-10-22 PROCEDURE — 90471 IMMUNIZATION ADMIN: CPT | Performed by: INTERNAL MEDICINE

## 2021-10-22 PROCEDURE — 90662 IIV NO PRSV INCREASED AG IM: CPT | Performed by: INTERNAL MEDICINE

## 2022-03-25 ENCOUNTER — ANESTHESIA EVENT (INPATIENT)
Dept: GASTROENTEROLOGY | Facility: HOSPITAL | Age: 87
DRG: 445 | End: 2022-03-25
Payer: COMMERCIAL

## 2022-03-25 ENCOUNTER — APPOINTMENT (EMERGENCY)
Dept: ULTRASOUND IMAGING | Facility: HOSPITAL | Age: 87
DRG: 445 | End: 2022-03-25
Payer: COMMERCIAL

## 2022-03-25 ENCOUNTER — APPOINTMENT (EMERGENCY)
Dept: CT IMAGING | Facility: HOSPITAL | Age: 87
DRG: 445 | End: 2022-03-25
Payer: COMMERCIAL

## 2022-03-25 ENCOUNTER — APPOINTMENT (INPATIENT)
Dept: RADIOLOGY | Facility: HOSPITAL | Age: 87
DRG: 445 | End: 2022-03-25
Payer: COMMERCIAL

## 2022-03-25 ENCOUNTER — ANESTHESIA (INPATIENT)
Dept: GASTROENTEROLOGY | Facility: HOSPITAL | Age: 87
DRG: 445 | End: 2022-03-25
Payer: COMMERCIAL

## 2022-03-25 ENCOUNTER — HOSPITAL ENCOUNTER (INPATIENT)
Facility: HOSPITAL | Age: 87
LOS: 1 days | Discharge: HOME | DRG: 445 | End: 2022-03-26
Attending: EMERGENCY MEDICINE | Admitting: INTERNAL MEDICINE
Payer: COMMERCIAL

## 2022-03-25 ENCOUNTER — APPOINTMENT (INPATIENT)
Dept: GASTROENTEROLOGY | Facility: HOSPITAL | Age: 87
DRG: 445 | End: 2022-03-25
Payer: COMMERCIAL

## 2022-03-25 DIAGNOSIS — R74.01 TRANSAMINITIS: Primary | ICD-10-CM

## 2022-03-25 DIAGNOSIS — K80.50 CHOLEDOCHOLITHIASIS: ICD-10-CM

## 2022-03-25 DIAGNOSIS — K80.51 CHOLEDOCHOLITHIASIS WITH OBSTRUCTION: ICD-10-CM

## 2022-03-25 LAB
ALBUMIN SERPL BCP-MCNC: 3.7 G/DL (ref 3.5–5)
ALP SERPL-CCNC: 305 U/L (ref 46–116)
ALT SERPL W P-5'-P-CCNC: 502 U/L (ref 12–78)
AMMONIA PLAS-SCNC: 20 UMOL/L (ref 11–35)
ANION GAP SERPL CALCULATED.3IONS-SCNC: 13 MMOL/L (ref 4–13)
APTT PPP: 32 SECONDS (ref 23–37)
AST SERPL W P-5'-P-CCNC: 243 U/L (ref 5–45)
BASOPHILS # BLD AUTO: 0.06 THOUSANDS/ΜL (ref 0–0.1)
BASOPHILS NFR BLD AUTO: 1 % (ref 0–1)
BILIRUB DIRECT SERPL-MCNC: 7.49 MG/DL (ref 0–0.2)
BILIRUB SERPL-MCNC: 9.14 MG/DL (ref 0.2–1)
BILIRUB UR QL STRIP: ABNORMAL
BUN SERPL-MCNC: 14 MG/DL (ref 5–25)
CALCIUM SERPL-MCNC: 9.2 MG/DL (ref 8.3–10.1)
CHLORIDE SERPL-SCNC: 99 MMOL/L (ref 100–108)
CLARITY UR: CLEAR
CO2 SERPL-SCNC: 24 MMOL/L (ref 21–32)
COLOR UR: YELLOW
CREAT SERPL-MCNC: 0.67 MG/DL (ref 0.6–1.3)
EOSINOPHIL # BLD AUTO: 0.38 THOUSAND/ΜL (ref 0–0.61)
EOSINOPHIL NFR BLD AUTO: 4 % (ref 0–6)
ERYTHROCYTE [DISTWIDTH] IN BLOOD BY AUTOMATED COUNT: 14 % (ref 11.6–15.1)
GFR SERPL CREATININE-BSD FRML MDRD: 85 ML/MIN/1.73SQ M
GLUCOSE SERPL-MCNC: 101 MG/DL (ref 65–140)
GLUCOSE UR STRIP-MCNC: NEGATIVE MG/DL
HCT VFR BLD AUTO: 37.4 % (ref 36.5–49.3)
HGB BLD-MCNC: 12.6 G/DL (ref 12–17)
HGB UR QL STRIP.AUTO: NEGATIVE
IMM GRANULOCYTES # BLD AUTO: 0.05 THOUSAND/UL (ref 0–0.2)
IMM GRANULOCYTES NFR BLD AUTO: 1 % (ref 0–2)
INR PPP: 1.18 (ref 0.84–1.19)
KETONES UR STRIP-MCNC: ABNORMAL MG/DL
LEUKOCYTE ESTERASE UR QL STRIP: NEGATIVE
LIPASE SERPL-CCNC: 238 U/L (ref 73–393)
LYMPHOCYTES # BLD AUTO: 0.62 THOUSANDS/ΜL (ref 0.6–4.47)
LYMPHOCYTES NFR BLD AUTO: 7 % (ref 14–44)
MAGNESIUM SERPL-MCNC: 2.1 MG/DL (ref 1.6–2.6)
MCH RBC QN AUTO: 31.3 PG (ref 26.8–34.3)
MCHC RBC AUTO-ENTMCNC: 33.7 G/DL (ref 31.4–37.4)
MCV RBC AUTO: 93 FL (ref 82–98)
MONOCYTES # BLD AUTO: 1.25 THOUSAND/ΜL (ref 0.17–1.22)
MONOCYTES NFR BLD AUTO: 14 % (ref 4–12)
NEUTROPHILS # BLD AUTO: 6.81 THOUSANDS/ΜL (ref 1.85–7.62)
NEUTS SEG NFR BLD AUTO: 73 % (ref 43–75)
NITRITE UR QL STRIP: NEGATIVE
NRBC BLD AUTO-RTO: 0 /100 WBCS
PH UR STRIP.AUTO: 6 [PH] (ref 4.5–8)
PLATELET # BLD AUTO: 195 THOUSANDS/UL (ref 149–390)
PMV BLD AUTO: 10.1 FL (ref 8.9–12.7)
POTASSIUM SERPL-SCNC: 4.3 MMOL/L (ref 3.5–5.3)
PROT SERPL-MCNC: 7.1 G/DL (ref 6.4–8.2)
PROT UR STRIP-MCNC: NEGATIVE MG/DL
PROTHROMBIN TIME: 14.7 SECONDS (ref 11.6–14.5)
RBC # BLD AUTO: 4.02 MILLION/UL (ref 3.88–5.62)
SODIUM SERPL-SCNC: 136 MMOL/L (ref 136–145)
SP GR UR STRIP.AUTO: 1.01 (ref 1–1.03)
UROBILINOGEN UR QL STRIP.AUTO: 0.2 E.U./DL
WBC # BLD AUTO: 9.17 THOUSAND/UL (ref 4.31–10.16)

## 2022-03-25 PROCEDURE — 36415 COLL VENOUS BLD VENIPUNCTURE: CPT | Performed by: PHYSICIAN ASSISTANT

## 2022-03-25 PROCEDURE — 81003 URINALYSIS AUTO W/O SCOPE: CPT

## 2022-03-25 PROCEDURE — 96374 THER/PROPH/DIAG INJ IV PUSH: CPT

## 2022-03-25 PROCEDURE — 0FC98ZZ EXTIRPATION OF MATTER FROM COMMON BILE DUCT, VIA NATURAL OR ARTIFICIAL OPENING ENDOSCOPIC: ICD-10-PCS | Performed by: INTERNAL MEDICINE

## 2022-03-25 PROCEDURE — 74177 CT ABD & PELVIS W/CONTRAST: CPT

## 2022-03-25 PROCEDURE — 99285 EMERGENCY DEPT VISIT HI MDM: CPT | Performed by: PHYSICIAN ASSISTANT

## 2022-03-25 PROCEDURE — 83735 ASSAY OF MAGNESIUM: CPT | Performed by: PHYSICIAN ASSISTANT

## 2022-03-25 PROCEDURE — 96361 HYDRATE IV INFUSION ADD-ON: CPT

## 2022-03-25 PROCEDURE — C2617 STENT, NON-COR, TEM W/O DEL: HCPCS

## 2022-03-25 PROCEDURE — 82140 ASSAY OF AMMONIA: CPT | Performed by: PHYSICIAN ASSISTANT

## 2022-03-25 PROCEDURE — 99222 1ST HOSP IP/OBS MODERATE 55: CPT | Performed by: PHYSICIAN ASSISTANT

## 2022-03-25 PROCEDURE — 83690 ASSAY OF LIPASE: CPT | Performed by: PHYSICIAN ASSISTANT

## 2022-03-25 PROCEDURE — 43274 ERCP DUCT STENT PLACEMENT: CPT | Performed by: INTERNAL MEDICINE

## 2022-03-25 PROCEDURE — 76705 ECHO EXAM OF ABDOMEN: CPT

## 2022-03-25 PROCEDURE — C1769 GUIDE WIRE: HCPCS

## 2022-03-25 PROCEDURE — 74330 X-RAY BILE/PANC ENDOSCOPY: CPT

## 2022-03-25 PROCEDURE — G1004 CDSM NDSC: HCPCS

## 2022-03-25 PROCEDURE — 82248 BILIRUBIN DIRECT: CPT | Performed by: PHYSICIAN ASSISTANT

## 2022-03-25 PROCEDURE — 80053 COMPREHEN METABOLIC PANEL: CPT | Performed by: PHYSICIAN ASSISTANT

## 2022-03-25 PROCEDURE — 99285 EMERGENCY DEPT VISIT HI MDM: CPT

## 2022-03-25 PROCEDURE — 85610 PROTHROMBIN TIME: CPT | Performed by: PHYSICIAN ASSISTANT

## 2022-03-25 PROCEDURE — 99284 EMERGENCY DEPT VISIT MOD MDM: CPT | Performed by: PHYSICIAN ASSISTANT

## 2022-03-25 PROCEDURE — 99223 1ST HOSP IP/OBS HIGH 75: CPT | Performed by: PHYSICIAN ASSISTANT

## 2022-03-25 PROCEDURE — 85025 COMPLETE CBC W/AUTO DIFF WBC: CPT | Performed by: PHYSICIAN ASSISTANT

## 2022-03-25 PROCEDURE — 85730 THROMBOPLASTIN TIME PARTIAL: CPT | Performed by: PHYSICIAN ASSISTANT

## 2022-03-25 RX ORDER — LISINOPRIL 10 MG/1
10 TABLET ORAL DAILY
Status: DISCONTINUED | OUTPATIENT
Start: 2022-03-26 | End: 2022-03-26 | Stop reason: HOSPADM

## 2022-03-25 RX ORDER — ROCURONIUM BROMIDE 10 MG/ML
INJECTION, SOLUTION INTRAVENOUS AS NEEDED
Status: DISCONTINUED | OUTPATIENT
Start: 2022-03-25 | End: 2022-03-25

## 2022-03-25 RX ORDER — SODIUM CHLORIDE 9 MG/ML
75 INJECTION, SOLUTION INTRAVENOUS CONTINUOUS
Status: DISCONTINUED | OUTPATIENT
Start: 2022-03-25 | End: 2022-03-26 | Stop reason: HOSPADM

## 2022-03-25 RX ORDER — MORPHINE SULFATE 4 MG/ML
4 INJECTION, SOLUTION INTRAMUSCULAR; INTRAVENOUS ONCE
Status: COMPLETED | OUTPATIENT
Start: 2022-03-25 | End: 2022-03-25

## 2022-03-25 RX ORDER — NEOSTIGMINE METHYLSULFATE 1 MG/ML
INJECTION INTRAVENOUS AS NEEDED
Status: DISCONTINUED | OUTPATIENT
Start: 2022-03-25 | End: 2022-03-25

## 2022-03-25 RX ORDER — CALCIUM CARBONATE 200(500)MG
1000 TABLET,CHEWABLE ORAL DAILY PRN
Status: DISCONTINUED | OUTPATIENT
Start: 2022-03-25 | End: 2022-03-26 | Stop reason: HOSPADM

## 2022-03-25 RX ORDER — ONDANSETRON 2 MG/ML
4 INJECTION INTRAMUSCULAR; INTRAVENOUS EVERY 6 HOURS PRN
Status: DISCONTINUED | OUTPATIENT
Start: 2022-03-25 | End: 2022-03-26 | Stop reason: HOSPADM

## 2022-03-25 RX ORDER — PROPOFOL 10 MG/ML
INJECTION, EMULSION INTRAVENOUS AS NEEDED
Status: DISCONTINUED | OUTPATIENT
Start: 2022-03-25 | End: 2022-03-25

## 2022-03-25 RX ORDER — GLYCOPYRROLATE 0.2 MG/ML
INJECTION INTRAMUSCULAR; INTRAVENOUS AS NEEDED
Status: DISCONTINUED | OUTPATIENT
Start: 2022-03-25 | End: 2022-03-25

## 2022-03-25 RX ADMIN — PIPERACILLIN SODIUM AND TAZOBACTAM SODIUM 3.38 G: 36; 4.5 INJECTION, POWDER, LYOPHILIZED, FOR SOLUTION INTRAVENOUS at 23:58

## 2022-03-25 RX ADMIN — MORPHINE SULFATE 4 MG: 4 INJECTION INTRAVENOUS at 09:24

## 2022-03-25 RX ADMIN — SODIUM CHLORIDE 75 ML/HR: 0.9 INJECTION, SOLUTION INTRAVENOUS at 15:21

## 2022-03-25 RX ADMIN — IOHEXOL 100 ML: 350 INJECTION, SOLUTION INTRAVENOUS at 10:13

## 2022-03-25 RX ADMIN — ROCURONIUM BROMIDE 35 MG: 50 INJECTION, SOLUTION INTRAVENOUS at 16:31

## 2022-03-25 RX ADMIN — PROPOFOL 150 MG: 10 INJECTION, EMULSION INTRAVENOUS at 16:31

## 2022-03-25 RX ADMIN — GLYCOPYRROLATE 0.4 MG: 0.2 INJECTION, SOLUTION INTRAMUSCULAR; INTRAVENOUS at 17:14

## 2022-03-25 RX ADMIN — PIPERACILLIN SODIUM AND TAZOBACTAM SODIUM 3.38 G: 36; 4.5 INJECTION, POWDER, LYOPHILIZED, FOR SOLUTION INTRAVENOUS at 17:30

## 2022-03-25 RX ADMIN — SODIUM CHLORIDE 1000 ML: 0.9 INJECTION, SOLUTION INTRAVENOUS at 09:23

## 2022-03-25 RX ADMIN — NEOSTIGMINE METHYLSULFATE 3 MG: 1 INJECTION INTRAVENOUS at 17:14

## 2022-03-25 NOTE — CONSULTS
Consultation -General Surgery  Nakia Timmons 80 y o  male MRN: [id number]  Unit/Bed#: ED 19 Encounter: [id number]        Inpatient consult to Acute Care Surgery  Consult performed by: Willie Nolasco PA-C  Consult ordered by: Samantha Healy PA-C          ASSESSMENT/PLAN:  Medical Problems     Problem List     Jaundice  Possible choledocholithiasis  Gall bladder calcifications  Hyperbilirubinemia    Hypertension    Hyperlipidemia    Status post mechanical aortic valve replacement    Atrial flutter ([address] )   81 yo M with history of AVR on Xarelto presents with 3-4 day history of intermittent RUQ pain  CT shows possible choledocholithiasis and he has transaminitis, T bili of 9 14  Patient is pain free at this time and GI consult is pending  No leukocytosis  · Ck RUQ US   · GI consult   · Consider MR study  · IVF   · AM labs   · Primary team to evaluate              Reason for Consult / Principal Problem: RUQ abdominal pain    HPI: Nakia Timmons is a 80y o  year old male who presents with history of AVR on Xarelto, HTN, HLD, biliary disease 25 years ago presents with 3-4 day history of intermittent RUQ pain  Patient says pain has been intermittent for the past 2 years but over the last 3-4 increased, rating it 2-9/10  Associated complaints are constipation for 1 week and jaundice for the past 7 days  Denies nausea or vomiting  He has not had follow up for his biliary disease for years  He is pain free after receiving morphine IV in ED  CT AP shows:  1  Massive intra and extrahepatic biliary ductal dilatation with small calcifications in the distal common bile duct possibly choledocholithiasis  The possibility of a calcified mass lesion difficult to exclude  Recommend right upper quadrant   ultrasound for further assessment  Further clinical assessment advised  2   Coarse, curvilinear calcifications associated with the gallbladder fundus, 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  The possibility of a lobulated intraluminal calculi in the nondistended fundus of the gallbladder is in the   differential diagnosis  Ultrasound may be able to further characterize this abnormality as well  3   Multifocal intermittent dilatation of the pancreatic duct admixed with areas of nondilatation possibly on the basis of an obstructing lesion in the region of the ampulla, potentially choledocholithiasis although underlying mass lesion should be   considered  4   Massive prostatic enlargement with marked bladder distention, trabeculation and bladder diverticula reflective of chronic urostasis  5   Diverticulosis coli    Review of Systems   Constitutional: Negative for activity change, appetite change, chills, diaphoresis, fatigue and fever  HENT: Negative for congestion  Respiratory: Negative for cough, choking, chest tightness and shortness of breath  Cardiovascular: Negative for chest pain, palpitations and leg swelling  Gastrointestinal: Positive for abdominal pain and constipation  Negative for abdominal distention, diarrhea and nausea  Genitourinary: Negative for difficulty urinating, flank pain and frequency  Musculoskeletal: Negative for arthralgias, back pain and gait problem  Skin: Positive for color change  Neurological: Negative for dizziness and facial asymmetry  Hematological: Does not bruise/bleed easily  Psychiatric/Behavioral: Negative for agitation         Historical Information   Past Medical History:   Diagnosis Date    Acute bacterial prostatitis     last assessed: 5/29/15    Aortic valve stenosis     last assessed: 03/10/15    Coronary atherosclerosis     last assessed: 03/10/15    Left inguinal hernia     last assessed: 03/10/15    Mitral valve disorder     Status post laparoscopic hernia repair     last assessed: 04/14/15     Past Surgical History:   Procedure Laterality Date    AORTIC VALVE REPLACEMENT  02/2008    CATARACT EXTRACTION  09/08/2014    INGUINAL HERNIA REPAIR Right     2012    INGUINAL HERNIA REPAIR Left     2017    TONSILLECTOMY       Social History   Social History     Substance and Sexual Activity   Alcohol Use Not Currently    Comment: rarely a beer     Social History     Substance and Sexual Activity   Drug Use Never     Social History     Tobacco Use   Smoking Status Former Smoker    Years: 20 00    Types: Cigars, Pipe    Quit date: 2005    Years since quittin 2   Smokeless Tobacco Never Used     Family History   Problem Relation Age of Onset    Leukemia Mother     Coronary artery disease Father     Leukemia Father     Coronary artery disease Other     Leukemia Other        Meds/Allergies   Home medications:   to Admission Medications   Prescriptions Last Dose Informant Patient Reported? Taking? CITRUS BIOFLAVONOIDS PO     Yes No   Sig: Take by mouth   Coenzyme Q10 (COQ-10) 100 MG CAPS     Yes No   Sig: Take by mouth   DHEA 25 MG CAPS     Yes No   Sig: Take by mouth   TURMERIC CURCUMIN PO     Yes No   Sig: Take by mouth   Xarelto 20 MG tablet     Yes No   Sig: Take 20 mg by mouth daily after dinner   furosemide (LASIX) 20 mg tablet     Yes No   Sig: Take 1 tablet by mouth daily PRN leg edema   lisinopril (ZESTRIL) 10 mg tablet     Yes No   Sig: Take 1 tablet by mouth daily      Facility-Administered Medications: None       All meds confirmed with patient      No Known Allergies    Objective     Blood pressure (!) 179/75, pulse 70, temperature 98 3 °F (36 8 °C), temperature source Oral, resp  rate 18, height 6' 1" (1 854 m), SpO2 98 %    No intake or output data in the 24 hours ending 22 1224    PHYSICAL EXAM  General appearance: alert and oriented, in no acute distress  Skin: jaundice noted  Head: Normocephalic, without obvious abnormality  Heart: regular rate and rhythm, S1, S2 normal, no murmur, click, rub or gallop  Lungs: clear to auscultation bilaterally  Abdomen: soft, non-tender; bowel sounds normal; no masses,  no organomegaly  Back: negative  Rectal: deferred  Neurological: normal without focal findings    Lab Results:   Admission on 03/25/2022   Component Date Value    WBC 03/25/2022 9 17     RBC 03/25/2022 4 02     Hemoglobin 03/25/2022 12 6     Hematocrit 03/25/2022 37 4     MCV 03/25/2022 93     MCH 03/25/2022 31 3     MCHC 03/25/2022 33 7     RDW 03/25/2022 14 0     MPV 03/25/2022 10 1     Platelets 80/27/2819 195     nRBC 03/25/2022 0     Neutrophils Relative 03/25/2022 73     Immat GRANS % 03/25/2022 1     Lymphocytes Relative 03/25/2022 7*    Monocytes Relative 03/25/2022 14*    Eosinophils Relative 03/25/2022 4     Basophils Relative 03/25/2022 1     Neutrophils Absolute 03/25/2022 6 81     Immature Grans Absolute 03/25/2022 0 05     Lymphocytes Absolute 03/25/2022 0 62     Monocytes Absolute 03/25/2022 1 25*    Eosinophils Absolute 03/25/2022 0 38     Basophils Absolute 03/25/2022 0 06     Sodium 03/25/2022 136     Potassium 03/25/2022 4 3     Chloride 03/25/2022 99*    CO2 03/25/2022 24     ANION GAP 03/25/2022 13     BUN 03/25/2022 14     Creatinine 03/25/2022 0 67     Glucose 03/25/2022 101     Calcium 03/25/2022 9 2     AST 03/25/2022 243*    ALT 03/25/2022 502*    Alkaline Phosphatase 03/25/2022 305*    Total Protein 03/25/2022 7 1     Albumin 03/25/2022 3 7     Total Bilirubin 03/25/2022 9 14*    eGFR 03/25/2022 85     Lipase 03/25/2022 238     Magnesium 03/25/2022 2 1     Ammonia 03/25/2022 20     Protime 03/25/2022 14 7*    INR 03/25/2022 1 18     PTT 03/25/2022 32     Color, UA 03/25/2022 Yellow     Clarity, UA 03/25/2022 Clear     pH, UA 03/25/2022 6 0     Leukocytes, UA 03/25/2022 Negative     Nitrite, UA 03/25/2022 Negative     Protein, UA 03/25/2022 Negative     Glucose, UA 03/25/2022 Negative     Ketones, UA 03/25/2022 40 (2+)*    Urobilinogen, UA 03/25/2022 0 2     Bilirubin, UA 03/25/2022 Interference- unable to analyze*    Blood, UA 03/25/2022 Negative     Specific Gravity, UA 03/25/2022 1 010      Imaging Studies: I have personally reviewed pertinent reports  Counseling / Coordination of Care  Total time spent today  20 minutes  Greater than 50% of total time was spent with the patient and / or family counseling and / or coordination of care

## 2022-03-25 NOTE — PLAN OF CARE
Problem: Nutrition/Hydration-ADULT  Goal: Nutrient/Hydration intake appropriate for improving, restoring or maintaining nutritional needs  Description: Monitor and assess patient's nutrition/hydration status for malnutrition  Collaborate with interdisciplinary team and initiate plan and interventions as ordered  Monitor patient's weight and dietary intake as ordered or per policy  Utilize nutrition screening tool and intervene as necessary  Determine patient's food preferences and provide high-protein, high-caloric foods as appropriate       INTERVENTIONS:  - Monitor oral intake, urinary output, labs, and treatment plans  - Assess nutrition and hydration status and recommend course of action  - Evaluate amount of meals eaten  - Assist patient with eating if necessary   - Allow adequate time for meals  - Recommend/ encourage appropriate diets, oral nutritional supplements, and vitamin/mineral supplements  - Order, calculate, and assess calorie counts as needed  - Recommend, monitor, and adjust tube feedings and TPN/PPN based on assessed needs  - Assess need for intravenous fluids  - Provide specific nutrition/hydration education as appropriate  - Include patient/family/caregiver in decisions related to nutrition  Outcome: Progressing     Problem: PAIN - ADULT  Goal: Verbalizes/displays adequate comfort level or baseline comfort level  Description: Interventions:  - Encourage patient to monitor pain and request assistance  - Assess pain using appropriate pain scale  - Administer analgesics based on type and severity of pain and evaluate response  - Implement non-pharmacological measures as appropriate and evaluate response  - Consider cultural and social influences on pain and pain management  - Notify physician/advanced practitioner if interventions unsuccessful or patient reports new pain  Outcome: Progressing     Problem: DISCHARGE PLANNING  Goal: Discharge to home or other facility with appropriate resources  Description: INTERVENTIONS:  - Identify barriers to discharge w/patient and caregiver  - Arrange for needed discharge resources and transportation as appropriate  - Identify discharge learning needs (meds, wound care, etc )  - Arrange for interpretive services to assist at discharge as needed  - Refer to Case Management Department for coordinating discharge planning if the patient needs post-hospital services based on physician/advanced practitioner order or complex needs related to functional status, cognitive ability, or social support system  Outcome: Progressing     Problem: Knowledge Deficit  Goal: Patient/family/caregiver demonstrates understanding of disease process, treatment plan, medications, and discharge instructions  Description: Complete learning assessment and assess knowledge base    Interventions:  - Provide teaching at level of understanding  - Provide teaching via preferred learning methods  Outcome: Progressing

## 2022-03-25 NOTE — ED PROVIDER NOTES
History  Chief Complaint   Patient presents with    Abdominal Pain     patient reports abdominal pain, constipation, nausea, jaundice and periodic episodes of emesis over the past week  Hx  of bile duct blockage  Abdominal Pain  Pain location:  RLQ and epigastric  Pain quality: aching, pressure and shooting    Pain radiates to:  R shoulder and epigastric region  Onset quality:  Gradual  Duration:  7 days  Timing:  Intermittent  Progression:  Waxing and waning  Chronicity:  New  Context comment:  Patient with hx of bile duct obstruction 25 years ago per his hx  Started with constipation 7 days ago, occasional vomiting, jaundice began 3-4 days ago  No known fever  Relieved by:  Nothing  Worsened by: Movement, palpation and position changes  Ineffective treatments: Bowel activity  Associated symptoms: belching, constipation, fatigue, flatus, nausea and vomiting    Associated symptoms: no chest pain, no chills, no cough, no diarrhea, no dysuria, no fever, no hematemesis, no hematochezia, no hematuria, no melena, no shortness of breath and no sore throat        Prior to Admission Medications   Prescriptions Last Dose Informant Patient Reported? Taking?    CITRUS BIOFLAVONOIDS PO   Yes Yes   Sig: Take by mouth   Coenzyme Q10 (COQ-10) 100 MG CAPS   Yes Yes   Sig: Take by mouth   DHEA 25 MG CAPS   Yes Yes   Sig: Take by mouth   TURMERIC CURCUMIN PO   Yes Yes   Sig: Take by mouth   Xarelto 20 MG tablet   Yes Yes   Sig: Take 20 mg by mouth daily after dinner   furosemide (LASIX) 20 mg tablet   Yes Yes   Sig: Take 1 tablet by mouth daily   lisinopril (ZESTRIL) 10 mg tablet   Yes Yes   Sig: Take 1 tablet by mouth daily      Facility-Administered Medications: None       Past Medical History:   Diagnosis Date    Acute bacterial prostatitis     last assessed: 5/29/15    Aortic valve stenosis     last assessed: 03/10/15    Coronary atherosclerosis     last assessed: 03/10/15    Left inguinal hernia     last assessed: 03/10/15    Mitral valve disorder     Status post laparoscopic hernia repair     last assessed: 04/14/15       Past Surgical History:   Procedure Laterality Date    AORTIC VALVE REPLACEMENT  2008    CATARACT EXTRACTION  2014    INGUINAL HERNIA REPAIR Right     2012    INGUINAL HERNIA REPAIR Left     2017    TONSILLECTOMY         Family History   Problem Relation Age of Onset    Leukemia Mother     Coronary artery disease Father     Leukemia Father     Coronary artery disease Other     Leukemia Other      I have reviewed and agree with the history as documented  E-Cigarette/Vaping    E-Cigarette Use Never User      E-Cigarette/Vaping Substances     Social History     Tobacco Use    Smoking status: Former Smoker     Years: 20      Types: Cigars, Pipe     Quit date: 2005     Years since quittin 2    Smokeless tobacco: Never Used   Vaping Use    Vaping Use: Never used   Substance Use Topics    Alcohol use: Not Currently     Comment: rarely a beer    Drug use: Never       Review of Systems   Constitutional: Positive for fatigue  Negative for chills and fever  HENT: Negative for ear pain and sore throat  Eyes: Negative for pain and visual disturbance  Respiratory: Negative for cough and shortness of breath  Cardiovascular: Negative for chest pain and palpitations  Gastrointestinal: Positive for abdominal pain, constipation, flatus, nausea and vomiting  Negative for diarrhea, hematemesis, hematochezia and melena  Genitourinary: Negative for dysuria and hematuria  Musculoskeletal: Negative for arthralgias and back pain  Skin: Positive for color change  Negative for rash  Neurological: Negative for seizures and syncope  Hematological: Bruises/bleeds easily  Psychiatric/Behavioral: Positive for sleep disturbance  Negative for behavioral problems and confusion  All other systems reviewed and are negative        Physical Exam  Physical Exam  Vitals and nursing note reviewed  Constitutional:       General: He is not in acute distress  Appearance: Normal appearance  He is not ill-appearing, toxic-appearing or diaphoretic  HENT:      Head: Normocephalic and atraumatic  Mouth/Throat:      Mouth: Mucous membranes are moist    Eyes:      General: No scleral icterus  Pupils: Pupils are equal, round, and reactive to light  Cardiovascular:      Rate and Rhythm: Bradycardia present  Rhythm irregular  Pulses: Normal pulses  Heart sounds: Normal heart sounds  Comments: 2/6 Blowing holosystolic murmur  Pulmonary:      Effort: Pulmonary effort is normal       Breath sounds: Normal breath sounds  Abdominal:      General: Abdomen is flat  Bowel sounds are normal  There is no distension  Palpations: Abdomen is soft  Tenderness: There is abdominal tenderness in the right upper quadrant and epigastric area  There is no guarding or rebound  Hernia: No hernia is present  Musculoskeletal:         General: No swelling or tenderness  Normal range of motion  Cervical back: Normal range of motion  Skin:     General: Skin is warm  Capillary Refill: Capillary refill takes less than 2 seconds  Coloration: Skin is jaundiced  Neurological:      General: No focal deficit present  Mental Status: He is alert and oriented to person, place, and time     Psychiatric:         Mood and Affect: Mood normal          Behavior: Behavior normal          Vital Signs  ED Triage Vitals   Temperature Pulse Respirations Blood Pressure SpO2   03/25/22 0904 03/25/22 0904 03/25/22 0904 03/25/22 0904 03/25/22 0904   98 3 °F (36 8 °C) 68 20 (!) 196/77 98 %      Temp Source Heart Rate Source Patient Position - Orthostatic VS BP Location FiO2 (%)   03/25/22 0904 03/25/22 0904 03/25/22 0904 03/25/22 0904 --   Oral Monitor Sitting Right arm       Pain Score       03/25/22 0924       3           Vitals:    03/25/22 0904 03/25/22 1026 03/25/22 1239   BP: Sydelle Punch ) 196/77 (!) 179/75 163/72   Pulse: 68 70 68   Patient Position - Orthostatic VS: Sitting Lying Lying         Visual Acuity      ED Medications  Medications   morphine (PF) 4 mg/mL injection 4 mg (4 mg Intravenous Given 3/25/22 0924)   sodium chloride 0 9 % bolus 1,000 mL (1,000 mL Intravenous New Bag 3/25/22 0923)   iohexol (OMNIPAQUE) 350 MG/ML injection (SINGLE-DOSE) 100 mL (100 mL Intravenous Given 3/25/22 1013)       Diagnostic Studies  Results Reviewed     Procedure Component Value Units Date/Time    Bilirubin, direct [[id number]]  (Abnormal) Collected: 03/25/22 0922    Lab Status: Final result Specimen: Blood from Arm, Right Updated: 03/25/22 1314     Bilirubin, Direct 7 49 mg/dL     Ammonia [[id number]]  (Normal) Collected: 03/25/22 1034    Lab Status: Final result Specimen: Blood from Arm, Right Updated: 03/25/22 1106     Ammonia 20 umol/L     Urine Macroscopic, POC [[id number]]  (Abnormal) Collected: 03/25/22 1028    Lab Status: Final result Specimen: Urine Updated: 03/25/22 1029     Color, UA Yellow     Clarity, UA Clear     pH, UA 6 0     Leukocytes, UA Negative     Nitrite, UA Negative     Protein, UA Negative mg/dl      Glucose, UA Negative mg/dl      Ketones, UA 40 (2+) mg/dl      Urobilinogen, UA 0 2 E U /dl      Bilirubin, UA Interference- unable to analyze     Blood, UA Negative     Specific Gravity, UA 1 010    Narrative:      CLINITEK RESULT    CMP [[id number]]  (Abnormal) Collected: 03/25/22 0922    Lab Status: Final result Specimen: Blood from Arm, Right Updated: 03/25/22 0952     Sodium 136 mmol/L      Potassium 4 3 mmol/L      Chloride 99 mmol/L      CO2 24 mmol/L      ANION GAP 13 mmol/L      BUN 14 mg/dL      Creatinine 0 67 mg/dL      Glucose 101 mg/dL      Calcium 9 2 mg/dL       U/L       U/L      Alkaline Phosphatase 305 U/L      Total Protein 7 1 g/dL      Albumin 3 7 g/dL      Total Bilirubin 9 14 mg/dL      eGFR 85 ml/min/1 73sq m     Narrative:      [employer] guidelines for Chronic Kidney Disease (CKD):     Stage 1 with normal or high GFR (GFR > 90 mL/min/1 73 square meters)    Stage 2 Mild CKD (GFR = 60-89 mL/min/1 73 square meters)    Stage 3A Moderate CKD (GFR = 45-59 mL/min/1 73 square meters)    Stage 3B Moderate CKD (GFR = 30-44 mL/min/1 73 square meters)    Stage 4 Severe CKD (GFR = 15-29 mL/min/1 73 square meters)    Stage 5 End Stage CKD (GFR <15 mL/min/1 73 square meters)  Note: GFR calculation is accurate only with a steady state creatinine    Lipase [[id number]]  (Normal) Collected: 03/25/22 0922    Lab Status: Final result Specimen: Blood from Arm, Right Updated: 03/25/22 0952     Lipase 238 u/L     Magnesium [[id number]]  (Normal) Collected: 03/25/22 0922    Lab Status: Final result Specimen: Blood from Arm, Right Updated: 03/25/22 0952     Magnesium 2 1 mg/dL     Protime-INR [[id number]]  (Abnormal) Collected: 03/25/22 0922    Lab Status: Final result Specimen: Blood from Arm, Right Updated: 03/25/22 0952     Protime 14 7 seconds      INR 1 18    APTT [[id number]]  (Normal) Collected: 03/25/22 0922    Lab Status: Final result Specimen: Blood from Arm, Right Updated: 03/25/22 0952     PTT 32 seconds     CBC and differential [[id number]]  (Abnormal) Collected: 03/25/22 0922    Lab Status: Final result Specimen: Blood from Arm, Right Updated: 03/25/22 0936     WBC 9 17 Thousand/uL      RBC 4 02 Million/uL      Hemoglobin 12 6 g/dL      Hematocrit 37 4 %      MCV 93 fL      MCH 31 3 pg      MCHC 33 7 g/dL      RDW 14 0 %      MPV 10 1 fL      Platelets 858 Thousands/uL      nRBC 0 /100 WBCs      Neutrophils Relative 73 %      Immat GRANS % 1 %      Lymphocytes Relative 7 %      Monocytes Relative 14 %      Eosinophils Relative 4 %      Basophils Relative 1 %      Neutrophils Absolute 6 81 Thousands/µL      Immature Grans Absolute 0 05 Thousand/uL      Lymphocytes Absolute 0 62 Thousands/µL      Monocytes Absolute 1 25 Thousand/µL Eosinophils Absolute 0 38 Thousand/µL      Basophils Absolute 0 06 Thousands/µL                  US right upper quadrant   Final Result by Drea Grossman MD (03/25 1302)      1  Choledocholithiasis with suspected 1 cm calculus at the distal common bile duct  There is prominent intra and extra hepatic bile duct dilatation  2   Limited evaluation of the gallbladder which appears contracted  There is calcification as described above which may present either calcification of the gallbladder or gallstones  Workstation performed: [id number]         CT Abdomen pelvis with contrast   Final Result by Iliana Coleman MD (03/25 1041)         1  Massive intra and extrahepatic biliary ductal dilatation with small calcifications in the distal common bile duct possibly choledocholithiasis  The possibility of a calcified mass lesion difficult to exclude  Recommend right upper quadrant    ultrasound for further assessment  Further clinical assessment advised  2   Coarse, curvilinear calcifications associated with the gallbladder fundus, 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  The possibility of a lobulated intraluminal calculi in the nondistended fundus of the gallbladder is in the    differential diagnosis  Ultrasound may be able to further characterize this abnormality as well  3   Multifocal intermittent dilatation of the pancreatic duct admixed with areas of nondilatation possibly on the basis of an obstructing lesion in the region of the ampulla, potentially choledocholithiasis although underlying mass lesion should be    considered  4   Massive prostatic enlargement with marked bladder distention, trabeculation and bladder diverticula reflective of chronic urostasis     5   Diverticulosis coli      Workstation performed: [id number]                    Procedures  Procedures         ED Course  ED Course as of 03/25/22 1359   Fri Mar 25, 2022   0954 Total bili 9 14   2924 TOTAL BILIRUBIN(!): 9 14   0954 Alkaline Phosphatase(!): 305   0954 ALT(!): 502   0954 AST(!): 243   0955 RUQ US ordered   1044 CT abd/pelvis:    1  Massive intra and extrahepatic biliary ductal dilatation with small calcifications in the distal common bile duct possibly choledocholithiasis  The possibility of a calcified mass lesion difficult to exclude  Recommend right upper quadrant   ultrasound for further assessment  Further clinical assessment advised  2   Coarse, curvilinear calcifications associated with the gallbladder fundus, 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  The possibility of a lobulated intraluminal calculi in the nondistended fundus of the gallbladder is in the   differential diagnosis  Ultrasound may be able to further characterize this abnormality as well  3   Multifocal intermittent dilatation of the pancreatic duct admixed with areas of nondilatation possibly on the basis of an obstructing lesion in the region of the ampulla, potentially choledocholithiasis although underlying mass lesion should be   considered  4   Massive prostatic enlargement with marked bladder distention, trabeculation and bladder diverticula reflective of chronic urostasis  5   Diverticulosis coli    Awaiting RUQ US   1044 TT to GI to discuss   1107 GI recommended adding on CA 19-9, will come and see patient and recommended getting gen surgery on board  TT sent to General Surgery   1114 Patient updated at this time on current plan   1141 Will update surgery once the RUQ US is completed   1244 Updated GI- patient may not consider staying  They have not seen the patient, updated them on RUQ US preliminary results, they will be down to see the patient soon  [address]: IMPRESSION:     1  Choledocholithiasis with suspected 1 cm calculus at the distal common bile duct  There is prominent intra and extra hepatic bile duct dilatation    2   Limited evaluation of the gallbladder which appears contracted  There is calcification as described above which may present either calcification of the gallbladder or gallstones  1309 Patient reports that his wife is unable to care for herself at home, but son is visiting until tomorrow so he would be willing to stay overnight  GI made aware as they have not seen the patient yet   1315 Direct bili is 7 49   1322 TT to [facility] for admission- awaiting to hear back from GI regarding need for ERCP- will determine if patient needs transfer   1341 GI at bedside now   1357 GI will take to OR today                               SBIRT 20yo+      Most Recent Value   SBIRT (25 yo +)    In order to provide better care to our patients, we are screening all of our patients for alcohol and drug use  Would it be okay to ask you these screening questions?  No Filed at: 03/25/2022 8806                    [facility]  Number of Diagnoses or Management Options  Choledocholithiasis with obstruction: new and requires workup  Transaminitis: new and requires workup     Amount and/or Complexity of Data Reviewed  Clinical lab tests: ordered and reviewed  Tests in the radiology section of CPT®: ordered and reviewed  Review and summarize past medical records: yes  Discuss the patient with other providers: yes  Independent visualization of images, tracings, or specimens: yes    Risk of Complications, Morbidity, and/or Mortality  Presenting problems: high  Diagnostic procedures: high  Management options: high    Patient Progress  Patient progress: stable      Disposition  Final diagnoses:   Transaminitis   Choledocholithiasis with obstruction     Time reflects when diagnosis was documented in both MDM as applicable and the Disposition within this note     Time User Action Codes Description Comment    3/25/2022 10:59 AM Haider Le Add [R74 [id number]] Transaminitis     3/25/2022 10:59 AM Emily Berry Add [K80 [id number]] Choledocholithiasis with obstruction     3/25/2022  1:58 PM Raven Veliz [K80 [id number]] Choledocholithiasis       ED Disposition     ED Disposition Condition Date/Time Comment    Admit Stable Fri Mar 25, 2022  1:57 PM Case was discussed with KAREN and the patient's admission status was agreed to be Admission Status: inpatient status to the service of Dr Tatyana Page   Follow-up Information    None         Patient's Medications   Discharge Prescriptions    No medications on file       No discharge procedures on file      PDMP Review     None          ED Provider  Electronically Signed by           Imani Bazan PA-C  03/25/22 4843
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
## 2022-03-25 NOTE — ANESTHESIA PREPROCEDURE EVALUATION
Procedure:  ERCP    Relevant Problems   CARDIO   (+) Atrial flutter (HCC)   (+) Hyperlipidemia   (+) Hypertension   (+) Status post mechanical aortic valve replacement      NEURO/PSYCH   (+) History of sciatica      Other   (+) Choledocholithiasis        Physical Exam    Airway    Mallampati score: II  TM Distance: >3 FB  Neck ROM: full     Dental   No notable dental hx     Cardiovascular  Rhythm: irregular, Rate: normal, Murmur,     Pulmonary      Other Findings        Anesthesia Plan  ASA Score- 3     Anesthesia Type- general with ASA Monitors  Additional Monitors:   Airway Plan: ETT  Plan Factors-Exercise tolerance (METS): >4 METS  Chart reviewed  Patient is not a current smoker  Patient instructed to abstain from smoking on day of procedure  Patient did not smoke on day of surgery  Obstructive sleep apnea risk education given perioperatively  Induction- intravenous  Postoperative Plan-     Informed Consent- Anesthetic plan and risks discussed with patient  1  Bovine aortic valve replacement for severe aortic stenosis in February 2008  2  Mild CAD with luminal irregularities at the time of this cath  3  Hyperlipidemia  4  PACs  Hypertension  Cardiac Testing:  Echocardiogram - (9/28/2015) EF 65-70%  Bio AVR with mild stenosis  Mild to mod MR  Mild TR  Reviewed, no changes

## 2022-03-25 NOTE — ASSESSMENT & PLAN NOTE
· Patient presented to the ED with complaint of abdominal pain and jaundice   · Had evidence of massive intra and extrahepatic biliary duct dilation with small calcifications concerning for choledocholithiasis; course calcifications associated with gallbladder fundus, indicative of course gallbladder  · Right upper quadrant ultrasound with evidence of choledocholithiasis with suspected 1 cm calculus in distal common bile duct  · , , alk-phos 305, T bili 9 14  · Seen in consult by Gastroenterology and General surgery  · Plan for ERCP this afternoon  · Recheck in AM  · IV fluid hydration

## 2022-03-25 NOTE — ANESTHESIA POSTPROCEDURE EVALUATION
Post-Op Assessment Note    CV Status:  Stable    Pain management: adequate     Mental Status:  Alert and awake   Hydration Status:  Euvolemic   PONV Controlled:  Controlled   Airway Patency:  Patent      Post Op Vitals Reviewed: Yes      Staff: Anesthesiologist         No complications documented      BP      Temp      Pulse     Resp      SpO2      /59   Pulse 65   Temp 99 3 °F (37 4 °C) (Temporal)   Resp 16   Ht 6' 1" (1 854 m)   Wt 91 6 kg (201 lb 15 1 oz)   SpO2 97%   BMI 26 64 kg/m²

## 2022-03-25 NOTE — H&P
[address] [facility]  H&P- Colen Gaucher 1933, 80 y o  male MRN: [id number]  Unit/Bed#: ED 19 Encounter: [id number]  Primary Care Provider: Johnny Bradley MD   Date and time admitted to hospital: 3/25/2022  8:57 AM    * Choledocholithiasis  Assessment & Plan  · Patient presented to the ED with complaint of abdominal pain and jaundice   · Had evidence of massive intra and extrahepatic biliary duct dilation with small calcifications concerning for choledocholithiasis; course calcifications associated with gallbladder fundus, indicative of course gallbladder  · Right upper quadrant ultrasound with evidence of choledocholithiasis with suspected 1 cm calculus in distal common bile duct  · , , alk-phos 305, T bili 9 14  · Seen in consult by Gastroenterology and General surgery  · Plan for ERCP this afternoon  · Recheck in AM  · IV fluid hydration    Atrial flutter ([address] )  Assessment & Plan  · History of a flutter  · Continue anticoagulation with Xarelto    Status post mechanical aortic valve replacement  Assessment & Plan  · Remote history of aortic valve replacement  · Continue anticoagulation with Xarelto    Hypertension  Assessment & Plan  · BP slightly elevated at time of admission  · Continue lisinopril  · Hold Lasix    VTE Pharmacologic Prophylaxis: VTE Score: 4 Moderate Risk (Score 3-4) - Pharmacological DVT Prophylaxis Ordered: rivaroxaban (Xarelto)  Code Status: No Order full code  Discussion with family: None  Anticipated Length of Stay: Patient will be admitted on an inpatient basis with an anticipated length of stay of greater than 2 midnights secondary to Choledocholithiasis  Total Time for Visit, including Counseling / Coordination of Care: 60 minutes Greater than 50% of this total time spent on direct patient counseling and coordination of care      Chief Complaint:  Abdominal pain    History of Present Illness:  Colen Gaucher is a 80 y o  male with a PMH of aortic valve stenosis status post replacement, hypertension, a flutter who presents with abdominal pain  Patient reports he has been having abdominal pain for the past 7 days  He notes pain was aching and radiated to his back and into his shoulder blade  He notes associated constipation  When it began he thought he was just constipated but then had worsening jaundice  He notes very poor appetite the past few days  He denies any associated fevers or chills  Does have associated nausea  He reports complete resolution of his pain with IV morphine given in the emergency room  Review of Systems:  Review of Systems   Constitutional: Negative for chills and fever  HENT: Negative for trouble swallowing  Eyes: Negative for visual disturbance  Respiratory: Negative for cough and shortness of breath  Cardiovascular: Negative for chest pain and leg swelling  Gastrointestinal: Positive for abdominal pain, constipation and nausea  Negative for vomiting  Genitourinary: Negative for difficulty urinating  Musculoskeletal: Positive for back pain  Skin: Positive for color change  Neurological: Negative for dizziness and weakness  Psychiatric/Behavioral: Negative for confusion  Past Medical and Surgical History:   Past Medical History:   Diagnosis Date    Acute bacterial prostatitis     last assessed: 5/29/15    Aortic valve stenosis     last assessed: 03/10/15    Coronary atherosclerosis     last assessed: 03/10/15    Left inguinal hernia     last assessed: 03/10/15    Mitral valve disorder     Status post laparoscopic hernia repair     last assessed: 04/14/15       Past Surgical History:   Procedure Laterality Date    AORTIC VALVE REPLACEMENT  02/2008    CATARACT EXTRACTION  09/08/2014    INGUINAL HERNIA REPAIR Right     2012    INGUINAL HERNIA REPAIR Left     2017    TONSILLECTOMY         Meds/Allergies:  Prior to Admission medications    Medication Sig Start Date End Date Taking? Authorizing Provider   CITRUS BIOFLAVONOIDS PO Take by mouth   Yes Historical Provider, MD   Coenzyme Q10 (COQ-10) 100 MG CAPS Take by mouth   Yes Historical Provider, MD   DHEA 25 MG CAPS Take by mouth   Yes Historical Provider, MD   furosemide (LASIX) 20 mg tablet Take 1 tablet by mouth daily 2/10/21  Yes Historical Provider, MD   lisinopril (ZESTRIL) 10 mg tablet Take 1 tablet by mouth daily 17  Yes Historical Provider, MD   TURMERIC CURCUMIN PO Take by mouth   Yes Historical Provider, MD   Xarelto 20 MG tablet Take 20 mg by mouth daily after dinner 21  Yes Historical Provider, MD     I have reviewed home medications with patient personally  Allergies: No Known Allergies    Social History:  Marital Status:    Occupation:  Unknown  Patient Pre-hospital Living Situation: Home  Patient Pre-hospital Level of Mobility: walks  Patient Pre-hospital Diet Restrictions:  None  Substance Use History:   Social History     Substance and Sexual Activity   Alcohol Use Not Currently    Comment: rarely a beer     Social History     Tobacco Use   Smoking Status Former Smoker    Years: 20 00    Types: Cigars, Pipe    Quit date: 2005    Years since quittin 2   Smokeless Tobacco Never Used     Social History     Substance and Sexual Activity   Drug Use Never       Family History:  Family History   Problem Relation Age of Onset    Leukemia Mother     Coronary artery disease Father     Leukemia Father     Coronary artery disease Other     Leukemia Other        Physical Exam:     Vitals:   Blood Pressure: 163/72 (22 1239)  Pulse: 68 (22 1239)  Temperature: 98 3 °F (36 8 °C) (22 0904)  Temp Source: Oral (22 0904)  Respirations: 18 (22 1239)  Height: 6' 1" (185 4 cm) (22 0904)  SpO2: 95 % (22 1239)    Physical Exam  Vitals reviewed  Constitutional:       General: He is not in acute distress  HENT:      Head: Normocephalic and atraumatic     Eyes: General: Scleral icterus present  Conjunctiva/sclera: Conjunctivae normal    Cardiovascular:      Rate and Rhythm: Normal rate and regular rhythm  Heart sounds: No murmur heard  Pulmonary:      Effort: Pulmonary effort is normal  No respiratory distress  Breath sounds: Normal breath sounds  Abdominal:      General: Bowel sounds are normal  There is no distension  Palpations: Abdomen is soft  Tenderness: There is no abdominal tenderness  Musculoskeletal:      Cervical back: Neck supple  Right lower leg: No edema  Left lower leg: No edema  Skin:     General: Skin is warm and dry  Coloration: Skin is jaundiced  Neurological:      Mental Status: He is alert and oriented to person, place, and time  Psychiatric:         Mood and Affect: Mood normal          Behavior: Behavior normal           Additional Data:     Lab Results:  Results from last 7 days   Lab Units 03/25/22  0922   WBC Thousand/uL 9 17   HEMOGLOBIN g/dL 12 6   HEMATOCRIT % 37 4   PLATELETS Thousands/uL 195   NEUTROS PCT % 73   LYMPHS PCT % 7*   MONOS PCT % 14*   EOS PCT % 4     Results from last 7 days   Lab Units 03/25/22  0922   SODIUM mmol/L 136   POTASSIUM mmol/L 4 3   CHLORIDE mmol/L 99*   CO2 mmol/L 24   BUN mg/dL 14   CREATININE mg/dL 0 67   ANION GAP mmol/L 13   CALCIUM mg/dL 9 2   ALBUMIN g/dL 3 7   TOTAL BILIRUBIN mg/dL 9 14*   ALK PHOS U/L 305*   ALT U/L 502*   AST U/L 243*   GLUCOSE RANDOM mg/dL 101     Results from last 7 days   Lab Units 03/25/22  0922   INR  1 18                   Imaging: Reviewed radiology reports from this admission including: abdominal/pelvic CT and ultrasound(s)  US right upper quadrant   Final Result by Gerardo Dan MD (03/25 1302)      1  Choledocholithiasis with suspected 1 cm calculus at the distal common bile duct  There is prominent intra and extra hepatic bile duct dilatation  2   Limited evaluation of the gallbladder which appears contracted  There is calcification as described above which may present either calcification of the gallbladder or gallstones  Workstation performed: [id number]         CT Abdomen pelvis with contrast   Final Result by Denys Villanueva MD (03/25 1041)         1  Massive intra and extrahepatic biliary ductal dilatation with small calcifications in the distal common bile duct possibly choledocholithiasis  The possibility of a calcified mass lesion difficult to exclude  Recommend right upper quadrant    ultrasound for further assessment  Further clinical assessment advised  2   Coarse, curvilinear calcifications associated with the gallbladder fundus, 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  The possibility of a lobulated intraluminal calculi in the nondistended fundus of the gallbladder is in the    differential diagnosis  Ultrasound may be able to further characterize this abnormality as well  3   Multifocal intermittent dilatation of the pancreatic duct admixed with areas of nondilatation possibly on the basis of an obstructing lesion in the region of the ampulla, potentially choledocholithiasis although underlying mass lesion should be    considered  4   Massive prostatic enlargement with marked bladder distention, trabeculation and bladder diverticula reflective of chronic urostasis  5   Diverticulosis coli      Workstation performed: [id number]         FL ERCP biliary and pancreatic    (Results Pending)       EKG and Other Studies Reviewed on Admission:   · EKG: No EKG obtained  ** Please Note: This note has been constructed using a voice recognition system   **

## 2022-03-26 VITALS
HEART RATE: 66 BPM | WEIGHT: 201.94 LBS | DIASTOLIC BLOOD PRESSURE: 54 MMHG | RESPIRATION RATE: 20 BRPM | BODY MASS INDEX: 26.76 KG/M2 | TEMPERATURE: 98.4 F | HEIGHT: 73 IN | OXYGEN SATURATION: 96 % | SYSTOLIC BLOOD PRESSURE: 115 MMHG

## 2022-03-26 PROBLEM — K82.8 PORCELAIN GALLBLADDER: Status: ACTIVE | Noted: 2022-03-26

## 2022-03-26 LAB
ALBUMIN SERPL BCP-MCNC: 3 G/DL (ref 3.5–5)
ALP SERPL-CCNC: 252 U/L (ref 46–116)
ALT SERPL W P-5'-P-CCNC: 386 U/L (ref 12–78)
ANION GAP SERPL CALCULATED.3IONS-SCNC: 8 MMOL/L (ref 4–13)
AST SERPL W P-5'-P-CCNC: 166 U/L (ref 5–45)
BASOPHILS # BLD AUTO: 0.04 THOUSANDS/ΜL (ref 0–0.1)
BASOPHILS NFR BLD AUTO: 1 % (ref 0–1)
BILIRUB SERPL-MCNC: 5.78 MG/DL (ref 0.2–1)
BUN SERPL-MCNC: 12 MG/DL (ref 5–25)
CALCIUM ALBUM COR SERPL-MCNC: 9 MG/DL (ref 8.3–10.1)
CALCIUM SERPL-MCNC: 8.2 MG/DL (ref 8.3–10.1)
CHLORIDE SERPL-SCNC: 100 MMOL/L (ref 100–108)
CO2 SERPL-SCNC: 24 MMOL/L (ref 21–32)
CREAT SERPL-MCNC: 0.8 MG/DL (ref 0.6–1.3)
EOSINOPHIL # BLD AUTO: 0.6 THOUSAND/ΜL (ref 0–0.61)
EOSINOPHIL NFR BLD AUTO: 8 % (ref 0–6)
ERYTHROCYTE [DISTWIDTH] IN BLOOD BY AUTOMATED COUNT: 14.5 % (ref 11.6–15.1)
GFR SERPL CREATININE-BSD FRML MDRD: 79 ML/MIN/1.73SQ M
GLUCOSE SERPL-MCNC: 98 MG/DL (ref 65–140)
HCT VFR BLD AUTO: 36 % (ref 36.5–49.3)
HGB BLD-MCNC: 12.1 G/DL (ref 12–17)
IMM GRANULOCYTES # BLD AUTO: 0.03 THOUSAND/UL (ref 0–0.2)
IMM GRANULOCYTES NFR BLD AUTO: 0 % (ref 0–2)
LYMPHOCYTES # BLD AUTO: 0.83 THOUSANDS/ΜL (ref 0.6–4.47)
LYMPHOCYTES NFR BLD AUTO: 11 % (ref 14–44)
MCH RBC QN AUTO: 31.4 PG (ref 26.8–34.3)
MCHC RBC AUTO-ENTMCNC: 33.6 G/DL (ref 31.4–37.4)
MCV RBC AUTO: 94 FL (ref 82–98)
MONOCYTES # BLD AUTO: 1.06 THOUSAND/ΜL (ref 0.17–1.22)
MONOCYTES NFR BLD AUTO: 14 % (ref 4–12)
NEUTROPHILS # BLD AUTO: 5.17 THOUSANDS/ΜL (ref 1.85–7.62)
NEUTS SEG NFR BLD AUTO: 66 % (ref 43–75)
NRBC BLD AUTO-RTO: 0 /100 WBCS
PLATELET # BLD AUTO: 177 THOUSANDS/UL (ref 149–390)
PMV BLD AUTO: 10.6 FL (ref 8.9–12.7)
POTASSIUM SERPL-SCNC: 4 MMOL/L (ref 3.5–5.3)
PROT SERPL-MCNC: 6 G/DL (ref 6.4–8.2)
RBC # BLD AUTO: 3.85 MILLION/UL (ref 3.88–5.62)
SODIUM SERPL-SCNC: 132 MMOL/L (ref 136–145)
WBC # BLD AUTO: 7.73 THOUSAND/UL (ref 4.31–10.16)

## 2022-03-26 PROCEDURE — 99232 SBSQ HOSP IP/OBS MODERATE 35: CPT | Performed by: INTERNAL MEDICINE

## 2022-03-26 PROCEDURE — 80053 COMPREHEN METABOLIC PANEL: CPT | Performed by: PHYSICIAN ASSISTANT

## 2022-03-26 PROCEDURE — 99239 HOSP IP/OBS DSCHRG MGMT >30: CPT | Performed by: INTERNAL MEDICINE

## 2022-03-26 PROCEDURE — 85025 COMPLETE CBC W/AUTO DIFF WBC: CPT | Performed by: PHYSICIAN ASSISTANT

## 2022-03-26 RX ORDER — AMOXICILLIN AND CLAVULANATE POTASSIUM 875; 125 MG/1; MG/1
1 TABLET, FILM COATED ORAL EVERY 12 HOURS SCHEDULED
Qty: 11 TABLET | Refills: 0 | Status: SHIPPED | OUTPATIENT
Start: 2022-03-26 | End: 2022-04-01

## 2022-03-26 RX ADMIN — SODIUM CHLORIDE 75 ML/HR: 0.9 INJECTION, SOLUTION INTRAVENOUS at 08:49

## 2022-03-26 RX ADMIN — LISINOPRIL 10 MG: 10 TABLET ORAL at 08:48

## 2022-03-26 RX ADMIN — PIPERACILLIN SODIUM AND TAZOBACTAM SODIUM 3.38 G: 36; 4.5 INJECTION, POWDER, LYOPHILIZED, FOR SOLUTION INTRAVENOUS at 05:35

## 2022-03-26 NOTE — PLAN OF CARE
Problem: Nutrition/Hydration-ADULT  Goal: Nutrient/Hydration intake appropriate for improving, restoring or maintaining nutritional needs  Description: Monitor and assess patient's nutrition/hydration status for malnutrition  Collaborate with interdisciplinary team and initiate plan and interventions as ordered  Monitor patient's weight and dietary intake as ordered or per policy  Utilize nutrition screening tool and intervene as necessary  Determine patient's food preferences and provide high-protein, high-caloric foods as appropriate       INTERVENTIONS:  - Monitor oral intake, urinary output, labs, and treatment plans  - Assess nutrition and hydration status and recommend course of action  - Evaluate amount of meals eaten  - Assist patient with eating if necessary   - Allow adequate time for meals  - Recommend/ encourage appropriate diets, oral nutritional supplements, and vitamin/mineral supplements  - Order, calculate, and assess calorie counts as needed  - Recommend, monitor, and adjust tube feedings and TPN/PPN based on assessed needs  - Assess need for intravenous fluids  - Provide specific nutrition/hydration education as appropriate  - Include patient/family/caregiver in decisions related to nutrition  Outcome: Progressing     Problem: PAIN - ADULT  Goal: Verbalizes/displays adequate comfort level or baseline comfort level  Description: Interventions:  - Encourage patient to monitor pain and request assistance  - Assess pain using appropriate pain scale  - Administer analgesics based on type and severity of pain and evaluate response  - Implement non-pharmacological measures as appropriate and evaluate response  - Consider cultural and social influences on pain and pain management  - Notify physician/advanced practitioner if interventions unsuccessful or patient reports new pain  Outcome: Progressing     Problem: DISCHARGE PLANNING  Goal: Discharge to home or other facility with appropriate resources  Description: INTERVENTIONS:  - Identify barriers to discharge w/patient and caregiver  - Arrange for needed discharge resources and transportation as appropriate  - Identify discharge learning needs (meds, wound care, etc )  - Arrange for interpretive services to assist at discharge as needed  - Refer to Case Management Department for coordinating discharge planning if the patient needs post-hospital services based on physician/advanced practitioner order or complex needs related to functional status, cognitive ability, or social support system  Outcome: Progressing     Problem: Knowledge Deficit  Goal: Patient/family/caregiver demonstrates understanding of disease process, treatment plan, medications, and discharge instructions  Description: Complete learning assessment and assess knowledge base    Interventions:  - Provide teaching at level of understanding  - Provide teaching via preferred learning methods  Outcome: Progressing     Problem: Potential for Falls  Goal: Patient will remain free of falls  Description: INTERVENTIONS:  - Educate patient/family on patient safety including physical limitations  - Instruct patient to call for assistance with activity   - Consult OT/PT to assist with strengthening/mobility   - Keep Call bell within reach  - Keep bed low and locked with side rails adjusted as appropriate  - Keep care items and personal belongings within reach  - Initiate and maintain comfort rounds  - Make Fall Risk Sign visible to staff  - Apply yellow socks and bracelet for high fall risk patients  - Consider moving patient to room near nurses station  Outcome: Progressing

## 2022-03-26 NOTE — PROGRESS NOTES
Progress Note - General Surgery   Sergey Shankar 80 y o  male MRN: [id number]  Unit/Bed#: [facility] 68 2 -01 Encounter: [id number]    Assessment:  80 M with choledocholithiasis and cholangitis s/p ERCP on 3/25    Vitals stable, afebrile  Tbili 5 from 9  WBC 7    Plan:  -will plan for outpatient surgical follow for further operative planning  -GI recs appreciated, continue zosyn for now   -trend LFTs  -may require eventual MRCP to further evaluate GB and pancreas    Subjective/Objective       Subjective:   Patient seen and examined at bedside  No acute events overnight  Pain controlled  Denies nausea, vomiting, fever, chills        Objective:     Blood pressure 155/67, pulse 63, temperature 98 3 °F (36 8 °C), resp  rate 16, height 6' 1" (1 854 m), weight 91 6 kg (201 lb 15 1 oz), SpO2 95 %  ,Body mass index is 26 64 kg/m²  Intake/Output Summary (Last 24 hours) at 3/25/2022 2242  Last data filed at 3/25/2022 1729  Gross per 24 hour   Intake 1900 ml   Output --   Net 1900 ml       Invasive Devices  Report    Peripheral Intravenous Line            Peripheral IV 03/25/22 Right Antecubital <1 day                Physical Exam:   General: NAD  Head: normocephalic, atraumatic  CV: Pulse regular  Lungs: no conversational dyspnea  Abdomen: soft, non distended, non tender, no guarding or rebound  Extremities: GRIMES, motor sensory intact  Neuro: awake, alert, answers questions appropriately      Lab, Imaging and other studies:I have personally reviewed pertinent lab results      VTE Pharmacologic Prophylaxis: Sequential compression device (Venodyne)   VTE Mechanical Prophylaxis: sequential compression device

## 2022-03-26 NOTE — ASSESSMENT & PLAN NOTE
· Noted to have abnormal calcification of the gallbladder consistent with porcelain gallbladder  · He was evaluated by surgery and he will need elective cholecystectomy    · Ambulatory referral placed for Dr Dilip Montes

## 2022-03-26 NOTE — DISCHARGE SUMMARY
[address]  Discharge- Errol Goes 1933, 80 y o  male MRN: [id number]  Unit/Bed#: [facility] 68 2 -01 Encounter: [id number]  Primary Care Provider: Vanda Chung MD   Date and time admitted to hospital: 3/25/2022  8:57 AM    * Choledocholithiasis  Assessment & Plan  · Presented with abdominal pain and jaundice and found to have choledocholithiasis with 1 cm calculus in the distal CBD  · Postoperative day 1 status post ERCP, sphincterotomy and stent placement  · He was initiated on Zosyn due to infected appearing sludge during the procedure and possible cholangitis  Today's doing well and is insistent on going home so that he can take care of his wife who has underlying dementia  He will need close follow-up with GI for further treatment and stent removal   He will be discharged on Augmentin 875 mg b i d  For tonight and for the next 5 days to complete 7 days of treatment  Porcelain gallbladder  Assessment & Plan  · Noted to have abnormal calcification of the gallbladder consistent with porcelain gallbladder  · He was evaluated by surgery and he will need elective cholecystectomy  · Ambulatory referral placed for Dr Carol Abdalla    S/P AVR (aortic valve replacement)  Assessment & Plan  · Remote history of aortic valve replacement, bioprosthetic  · The patient was advised to resume Xarelto on Monday 03/28/2022 in order to avoid postoperative bleeding from his recent ERCP and sphincterotomy  · Specific instructions placed on AVS    Atrial flutter ([address] )  Assessment & Plan  · History of atrial flutter maintain on Xarelto  · Due to his recent procedure with sphincterotomy, he was advised to restart his Xarelto on Monday to avoid immediate postoperative bleeding      Hypertension  Assessment & Plan  · Continue lisinopril on discharge      Discharging Physician / Practitioner: Teto Alejo MD  PCP: Vanda Chung MD  Admission Date:   Admission Orders (From admission, onward)     Ordered        03/25/22 1358  INPATIENT ADMISSION  Once                      Discharge Date: 03/26/22    Medical Problems             Resolved Problems  Date Reviewed: 3/26/2022    None              Consultations During Hospital Stay:  · GI  · Surgery    Procedures Performed:   · ERCP with sphincterotomy and 10 Danish by 7 cm straight duodenal bent plastic stent placed within the CBD    Significant Findings / Test Results:   · ERCP with sludge in the duct consistent with cholangitis  · CT abdomen-massive intra and extrahepatic biliary duct dilatation with small calcifications in the distal CBD  Coarse her V linear calcifications associated with did gallbladder indicative of porcelain gallbladder  Multifocal intermittent dilatation of the pancreatic duct admixed with areas of none dilation possibly on the basis of obstructing lesion within the am pill, potentially choledocholithiasis although underlying mass lesion should be considered  Incidental Findings:   · Massive prostatic enlargement with marked bladder distention, trabeculation and bladder diverticular reflective of chronic uro stasis  ·   Test Results Pending at Discharge (will require follow up): · None     Outpatient Tests Requested:  · Outpatient GI and surgical follow-up    Complications:  None    Reason for Admission:  Abdominal pain and jaundice    Hospital Course:     Dominga Castañeda is a 80 y o  male patient who originally presented to the hospital on 3/25/2022 due to abdominal pain and jaundice  He was found to have dilated bile ducts and choledocholithiasis  In addition his gallbladder had abnormal calcifications consistent with porcelain gallbladder  He underwent urgent ERCP with sphincterotomy and stent placement  There was sludge within she did duct suspicious for cholangitis so he was started on Zosyn    He tolerated the procedure well and today he is insistent on going home because he has take care of his wife who has underlying dementia  He will be discharged on Augmentin to complete 7 days of treatment  Ambulatory referrals were placed for surgery and GI to follow-up on his choledocholithiasis/abnormal gallbladder/abnormal pancreas    Please see above list of diagnoses and related plan for additional information  Condition at Discharge: good     Discharge Day Visit / Exam:     Subjective:  Feels back to normal   He tolerated regular food  He wants to go home because his wife has underlying dementia  No fever or chills  We talked about his condition and need for follow-up  We talked about the need to hold his Xarelto until Monday  He expressed understanding  Vitals: Blood Pressure: 115/54 (03/26/22 0738)  Pulse: 66 (03/26/22 0738)  Temperature: 98 4 °F (36 9 °C) (03/26/22 0738)  Temp Source: Oral (03/26/22 0738)  Respirations: 20 (03/26/22 0738)  Height: 6' 1" (185 4 cm) (03/25/22 1509)  Weight - Scale: 91 6 kg (201 lb 15 1 oz) (03/25/22 1509)  SpO2: 96 % (03/26/22 0738)  Exam:   Physical Exam  Constitutional:       Appearance: He is not ill-appearing  HENT:      Head: Normocephalic and atraumatic  Left Ear: There is no impacted cerumen  Mouth/Throat:      Pharynx: No posterior oropharyngeal erythema  Eyes:      General: Scleral icterus present  Cardiovascular:      Rate and Rhythm: Regular rhythm  Pulmonary:      Effort: No respiratory distress  Breath sounds: No wheezing or rales  Abdominal:      General: Abdomen is flat  There is no distension  Palpations: Abdomen is soft  Tenderness: There is no abdominal tenderness  Musculoskeletal:      Cervical back: Neck supple  Right lower leg: No edema  Left lower leg: No edema  Skin:     General: Skin is warm and dry  Neurological:      Mental Status: He is alert and oriented to person, place, and time     Psychiatric:         Mood and Affect: Mood normal          Behavior: Behavior normal        Discussion with Family: Offered to call his family, he declined    Discharge instructions/Information to patient and family:   See after visit summary for information provided to patient and family  Provisions for Follow-Up Care:  See after visit summary for information related to follow-up care and any pertinent home health orders  Disposition:     Home    Planned Readmission: no     Discharge Statement:  I spent >30 minutes discharging the patient  This time was spent on the day of discharge  I had direct contact with the patient on the day of discharge  Greater than 50% of the total time was spent examining patient, answering all patient questions, arranging and discussing plan of care with patient as well as directly providing post-discharge instructions  Additional time then spent on discharge activities  Discharge Medications:  See after visit summary for reconciled discharge medications provided to patient and family        ** Please Note: This note has been constructed using a voice recognition system **

## 2022-03-26 NOTE — UTILIZATION REVIEW
Initial Clinical Review    Admission: Date/Time/Statement:   Admission Orders (From admission, onward)     Ordered        03/25/22 1358  [facility] [place]  Once                      Orders Placed This Encounter   Procedures    INPATIENT ADMISSION     Standing Status:   Standing     Number of Occurrences:   1     Order Specific Question:   Level of Care     Answer:   Med Surg [16]     Order Specific Question:   Estimated length of stay     Answer:   More than 2 Midnights     Order Specific Question:   Certification     Answer:   I certify that inpatient services are medically necessary for this patient for a duration of greater than two midnights  See H&P and MD Progress Notes for additional information about the patient's course of treatment  ED Arrival Information     Expected Arrival Acuity    - 3/25/2022 08:54 Urgent         Means of arrival Escorted by Service Admission type    Walk-In Self Hospitalist Urgent         Arrival complaint    Abdominal Pain         Chief Complaint   Patient presents with    Abdominal Pain     patient reports abdominal pain, constipation, nausea, jaundice and periodic episodes of emesis over the past week  Hx  of bile duct blockage  Initial Presentation: 80 y o  male presented to ED from home as inpatient admission for choledocholithiasis  Patient c/o of abdominal pain x 3-4 days intermittent in RUQ  CT shows possible choledocholithiasis and he has transaminitis, constipation and jaundice and dark urine  He states the pain is better with postural changes and pressure on his back  The pain is worse with eating especially fatty foods  Poor appetite over last few days  Pmhx atrial flutter, AVR on Xarelto, HTN, HLD, biliary disease 25 years ago  On exam tender in right upper quadrant without rebound or guarding scleral icterus,and skin jaundice    Plan IVF, NPO,  ERCP today   FINDINGS:  · Limited views of the esophagus, stomach, duodenum and major papilla appeared normal  There were cratered ulcers present in the duodenal bulb and 2nd portion of the duodenum  · Deeply cannulated the common bile duct and pancreatic duct by employing double guidewire technique and using a traction sphincterotome  Used 260 cm x 0 035 in straight guidewire  Cannulation was difficult likely due to large obstructing stone in the distal duct  Injected contrast  Minimal contrast was injected revealing significant biliary dilation and large filling defect in the distal duct  · Performed medium major papilla sphincterotomy using a sphincterotome  No bleeding at the procedure site  Pus drained from the common bile duct after sphincterotomy  · Removed pus with 12 mm balloon in one sweep in the proximal common bile duct  Further sweeps and attempt to remove stone was not performed due to presence of pus within the common bile duct  Placed plastic, duodenal bend stent with length of 7 cm and diameter of 10 Fr in the minor papilla  IMPRESSION:  ERCP with sphincterotomy and sludge within the duct consistent with cholangitis  10 Fr x 7 cm straight duodenal bend plastic stent placed within the common bile duct       RECOMMENDATION:  Return to hospital judd for ongoing care  Hold Xarelto additional day  Patient to receive Zosyn post-operatively  Continue IV antibiotics after procedure  Follow LFTs  Will need repeat ERCP in 6-8 weeks for removal of stent and extraction of remaining choledocholithiasis once cholangitis resolves    03-26-22  80 M with choledocholithiasis and cholangitis s/p ERCP on 3/25,IV zosyn transition to oral antibiotics  Would hold Xarelto at least until Monday  He is adamant about leaving to take care of his demented wife         ED Triage Vitals   Temperature Pulse Respirations Blood Pressure SpO2   03/25/22 0904 03/25/22 0904 03/25/22 0904 03/25/22 0904 03/25/22 0904   98 3 °F (36 8 °C) 68 20 (!) 196/77 98 %      Temp Source Heart Rate Source Patient Position - Orthostatic VS BP Location FiO2 (%)   03/25/22 0904 03/25/22 0904 03/25/22 0904 03/25/22 0904 --   Oral Monitor Sitting Right arm       Pain Score       03/25/22 0924       3          Wt Readings from Last 1 Encounters:   03/25/22 91 6 kg (201 lb 15 1 oz)     Additional Vital Signs:   Date/Time Temp Pulse Resp BP MAP (mmHg) SpO2 O2 Device Patient Position - Orthostatic VS   03/26/22 07:38:02 98 4 °F (36 9 °C) 66 20 115/54 74 96 % None (Room air) Lying   03/25/22 21:07:49 98 3 °F (36 8 °C) 63 16 155/67 96 95 % -- --   03/25/22 2100 -- -- -- -- -- 98 % None (Room air) --   03/25/22 1747 -- 65 16 132/59 -- 97 % None (Room air) --   03/25/22 1732 -- 67 17 129/55 -- 97 % None (Room air) --   03/25/22 1613 99 3 °F (37 4 °C) 68 15 165/71 -- 99 % None (Room air) --   03/25/22 15:09:57 97 6 °F (36 4 °C) 66 17 155/68 97 96 % -- --   03/25/22 1443 -- 68 18 176/79 Abnormal  114 96 % None (Room air) Sitting   03/25/22 1239 -- 68 18 163/72 103 95 % None (Room air) Lying   03/25/22 1026 -- 70 18 179/75 Abnormal  -- 98 % None (Room air) Lying     Pertinent Labs/Diagnostic Test Results:   FL ERCP biliary and pancreatic   Final Result by Charlene Fernandez MD (03/26 1053)      Fluoroscopic guidance for ERCP demonstrating biliary ductal dilatation and choledocholithiasis with placement of common bile duct stent  Please see ERCP report for further details  US right upper quadrant   1  Choledocholithiasis with suspected 1 cm calculus at the distal common bile duct  There is prominent intra and extra hepatic bile duct dilatation  2   Limited evaluation of the gallbladder which appears contracted  There is calcification as described above which may present either calcification of the gallbladder or gallstones  CT Abdomen pelvis with contrast   1  Massive intra and extrahepatic biliary ductal dilatation with small calcifications in the distal common bile duct possibly choledocholithiasis  The possibility of a calcified mass lesion difficult to exclude  Recommend right upper quadrant    ultrasound for further assessment  Further clinical assessment advised  2   Coarse, curvilinear calcifications associated with the gallbladder fundus, 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  The possibility of a lobulated intraluminal calculi in the nondistended fundus of the gallbladder is in the    differential diagnosis  Ultrasound may be able to further characterize this abnormality as well  3   Multifocal intermittent dilatation of the pancreatic duct admixed with areas of nondilatation possibly on the basis of an obstructing lesion in the region of the ampulla, potentially choledocholithiasis although underlying mass lesion should be    considered  4   Massive prostatic enlargement with marked bladder distention, trabeculation and bladder diverticula reflective of chronic urostasis     5   Diverticulosis coli         Results from last 7 days   Lab Units 03/26/22 0426 03/25/22  0922   WBC Thousand/uL 7 73 9 17   HEMOGLOBIN g/dL 12 1 12 6   HEMATOCRIT % 36 0* 37 4   PLATELETS Thousands/uL 177 195   NEUTROS ABS Thousands/µL 5 17 6 81         Results from last 7 days   Lab Units 03/26/22 0426 03/25/22  0922   SODIUM mmol/L 132* 136   POTASSIUM mmol/L 4 0 4 3   CHLORIDE mmol/L 100 99*   CO2 mmol/L 24 24   ANION GAP mmol/L 8 13   BUN mg/dL 12 14   CREATININE mg/dL 0 80 0 67   EGFR ml/min/1 73sq m 79 85   CALCIUM mg/dL 8 2* 9 2   MAGNESIUM mg/dL  --  2 1     Results from last 7 days   Lab Units 03/26/22  0426 03/25/22  1034 03/25/22  0922   AST U/L 166*  --  243*   ALT U/L 386*  --  502*   ALK PHOS U/L 252*  --  305*   TOTAL PROTEIN g/dL 6 0*  --  7 1   ALBUMIN g/dL 3 0*  --  3 7   TOTAL BILIRUBIN mg/dL 5 78*  --  9 14*   BILIRUBIN DIRECT mg/dL  --   --  7 49*   AMMONIA umol/L  --  20  --          Results from last 7 days   Lab Units 03/26/22  0426 03/25/22  0922   GLUCOSE RANDOM mg/dL 98 101     Results from last 7 days   Lab Units 03/25/22  9412 PROTIME seconds 14 7*   INR  1 18   PTT seconds 32     Results from last 7 days   Lab Units 03/25/22  0922   LIPASE u/L 238     Results from last 7 days   Lab Units 03/25/22  1028   CLARITY UA  Clear   COLOR UA  Yellow   SPEC GRAV UA  1 010   PH UA  6 0   GLUCOSE UA mg/dl Negative   KETONES UA mg/dl 40 (2+)*   BLOOD UA  Negative   PROTEIN UA mg/dl Negative   NITRITE UA  Negative   BILIRUBIN UA  Interference- unable to analyze*   UROBILINOGEN UA E U /dl 0 2   LEUKOCYTES UA  Negative     FL   ED Treatment:   Medication Administration from 03/25/2022 0854 to 03/25/2022 1511       Date/Time Order Dose Route Action     03/25/2022 0924 morphine (PF) 4 mg/mL injection 4 mg 4 mg Intravenous Given     03/25/2022 0619 sodium chloride 0 9 % bolus 1,000 mL 1,000 mL Intravenous New Bag     03/25/2022 1013 iohexol (OMNIPAQUE) 350 MG/ML injection (SINGLE-DOSE) 100 mL 100 mL Intravenous Given        Past Medical History:   Diagnosis Date    Acute bacterial prostatitis     last assessed: 5/29/15    Aortic valve stenosis     last assessed: 03/10/15    Coronary atherosclerosis     last assessed: 03/10/15    Left inguinal hernia     last assessed: 03/10/15    Mitral valve disorder     Status post laparoscopic hernia repair     last assessed: 04/14/15     Present on Admission:   Atrial flutter ([address] )   Hypertension      Admitting Diagnosis: Choledocholithiasis [K80 50]  Abdominal pain [R10 9]  Transaminitis [R74 01]  Choledocholithiasis with obstruction [K80 51]  Age/Sex: 80 y o  male  Admission Orders:  Scheduled Medications:  lisinopril, 10 mg, Oral, Daily  piperacillin-tazobactam, 3 375 g, Intravenous, Q6H      Continuous IV Infusions:  sodium chloride, 75 mL/hr, Intravenous, Continuous      PRN Meds:  calcium carbonate, 1,000 mg, Oral, Daily PRN  morphine injection, 2 mg, Intravenous, Q4H PRN  ondansetron, 4 mg, Intravenous, Q6H PRN        IP CONSULT TO GASTROENTEROLOGY  IP CONSULT TO ACUTE CARE SURGERY  IP CONSULT TO ANESTHESIOLOGY    Network Utilization Review Department  ATTENTION: Please call with any questions or concerns to [phone] and carefully listen to the prompts so that you are directed to the right person  All voicemails are confidential   Alysia Worthy all requests for admission clinical reviews, approved or denied determinations and any other requests to dedicated fax number below belonging to the campus where the patient is receiving treatment   List of dedicated fax numbers for the Facilities:  [address] DENIALS (Administrative/Medical Necessity) [phone]   [address] (Maternity/NICU/Pediatrics) [phone]   [address] [place] [address]  [address] Medical [place] [address] [place] [address] [address] [facility] [place] [address] [phone]

## 2022-03-26 NOTE — NURSING NOTE
Patient's keena and IV were removed  Patient was verbally provided discharge information and a copy of the information was sent home with him  Patient was sent home with all belongings Patient was discharged via wyheelchair and accompanied by Tha Christianson RN  Patient was his own transport to his home residence      Cee Monique RN

## 2022-03-26 NOTE — ASSESSMENT & PLAN NOTE
· History of atrial flutter maintain on Xarelto  · Due to his recent procedure with sphincterotomy, he was advised to restart his Xarelto on Monday to avoid immediate postoperative bleeding

## 2022-03-26 NOTE — DISCHARGE INSTR - AVS FIRST PAGE
You were admitted for abnormal gallbladder and bile ducts  You underwent a procedure called ERCP and a stent was placed inside your bile duct  There was infection noted so you were started on antibiotics  Take Augmentin twice a day starting tonight and for the next 5 days  You were found to have an abnormal looking gallbladder called porcelain gallbladder  This will need surgical intervention/cholecystectomy in the future  You were referred to the surgeon for follow-up  His name is Dr Mariajose Gutierrez    Return to the emergency department if you develop worsening pain, intractable nausea or vomiting, fever or chills  Please call Dr Arin Sim on Monday to schedule a follow-up    Please do not take your Xarelto this weekend    Restart Xarelto on Monday 03/28/2022 to avoid bleeding from your recent procedure

## 2022-03-26 NOTE — PROGRESS NOTES
Patient Name: Jaylen Hazel  Patient MRN: [id number]  Date: 03/26/22  Service: [facility]    Subjective   Patient without complaints of abdominal pain  ERCP findings noted  Patient had cholangitis with stent placed and has large stone remaining which could not be easily removed  Did have papillotomy  He had been holding his Xarelto on his own because he did not feel well for several days prior to coming in  [facility]  He is adamant about leaving to take care of his demented wife    Vitals  Blood pressure 115/54, pulse 66, temperature 98 4 °F (36 9 °C), temperature source Oral, resp  rate 20, height 6' 1" (1 854 m), weight 91 6 kg (201 lb 15 1 oz), SpO2 96 %  HEENT slightly icterus  Abdomen benign nontender    Laboratory Studies  Results from last 7 days   Lab Units 03/26/22  0426 03/25/22  0922   WBC Thousand/uL 7 73 9 17   HEMOGLOBIN g/dL 12 1 12 6   HEMATOCRIT % 36 0* 37 4   PLATELETS Thousands/uL 177 195   INR   --  1 18     Results from last 7 days   Lab Units 03/26/22  0426 03/25/22  0922   POTASSIUM mmol/L 4 0 4 3   CHLORIDE mmol/L 100 99*   CO2 mmol/L 24 24   BUN mg/dL 12 14   CREATININE mg/dL 0 80 0 67   CALCIUM mg/dL 8 2* 9 2   ALK PHOS U/L 252* 305*   ALT U/L 386* 502*   AST U/L 166* 243*       Imaging and Other Studies      Inhouse Medications     Current Facility-Administered Medications:     calcium carbonate (TUMS) chewable tablet 1,000 mg, 1,000 mg, Oral, Daily PRN    lisinopril (ZESTRIL) tablet 10 mg, 10 mg, Oral, Daily, 10 mg at 03/26/22 0848    morphine injection 2 mg, 2 mg, Intravenous, Q4H PRN    ondansetron (ZOFRAN) injection 4 mg, 4 mg, Intravenous, Q6H PRN    piperacillin-tazobactam (ZOSYN) 3 375 g in sodium chloride 0 9 % 100 mL IVPB, 3 375 g, Intravenous, Q6H, 3 375 g at 03/26/22 0535    sodium chloride 0 9 % infusion, 75 mL/hr, Intravenous, Continuous, 75 mL/hr at 03/26/22 0849      Assessment/Plan:  1   Choledocholithiasis with cholangitis-status post stent with stone remaining and placed on IV antibiotics  2  Need for anticoagulation    Discussed with Dr Mary Conner  Would hold Xarelto at least until Monday  Transition to oral antibiotics  Patient understands this is not ideal   Will need follow-up with surgery and [facility] for repeat ERCP  Will transition to regular diet to make sure tolerates before discharge            Daxa Ramos MD

## 2022-03-26 NOTE — ASSESSMENT & PLAN NOTE
· Remote history of aortic valve replacement, bioprosthetic  · The patient was advised to resume Xarelto on Monday 03/28/2022 in order to avoid postoperative bleeding from his recent ERCP and sphincterotomy  · Specific instructions placed on AVS

## 2022-03-26 NOTE — ASSESSMENT & PLAN NOTE
· Presented with abdominal pain and jaundice and found to have choledocholithiasis with 1 cm calculus in the distal CBD  · Postoperative day 1 status post ERCP, sphincterotomy and stent placement  · He was initiated on Zosyn due to infected appearing sludge during the procedure and possible cholangitis  Today's doing well and is insistent on going home so that he can take care of his wife who has underlying dementia  He will need close follow-up with GI for further treatment and stent removal   He will be discharged on Augmentin 875 mg b i d  For tonight and for the next 5 days to complete 7 days of treatment

## 2022-03-26 NOTE — PLAN OF CARE
Problem: Nutrition/Hydration-ADULT  Goal: Nutrient/Hydration intake appropriate for improving, restoring or maintaining nutritional needs  Description: Monitor and assess patient's nutrition/hydration status for malnutrition  Collaborate with interdisciplinary team and initiate plan and interventions as ordered  Monitor patient's weight and dietary intake as ordered or per policy  Utilize nutrition screening tool and intervene as necessary  Determine patient's food preferences and provide high-protein, high-caloric foods as appropriate       INTERVENTIONS:  - Monitor oral intake, urinary output, labs, and treatment plans  - Assess nutrition and hydration status and recommend course of action  - Evaluate amount of meals eaten  - Assist patient with eating if necessary   - Allow adequate time for meals  - Recommend/ encourage appropriate diets, oral nutritional supplements, and vitamin/mineral supplements  - Order, calculate, and assess calorie counts as needed  - Recommend, monitor, and adjust tube feedings and TPN/PPN based on assessed needs  - Assess need for intravenous fluids  - Provide specific nutrition/hydration education as appropriate  - Include patient/family/caregiver in decisions related to nutrition  Outcome: Progressing     Problem: DISCHARGE PLANNING  Goal: Discharge to home or other facility with appropriate resources  Description: INTERVENTIONS:  - Identify barriers to discharge w/patient and caregiver  - Arrange for needed discharge resources and transportation as appropriate  - Identify discharge learning needs (meds, wound care, etc )  - Arrange for interpretive services to assist at discharge as needed  - Refer to Case Management Department for coordinating discharge planning if the patient needs post-hospital services based on physician/advanced practitioner order or complex needs related to functional status, cognitive ability, or social support system  Outcome: Progressing     Problem: Knowledge Deficit  Goal: Patient/family/caregiver demonstrates understanding of disease process, treatment plan, medications, and discharge instructions  Description: Complete learning assessment and assess knowledge base    Interventions:  - Provide teaching at level of understanding  - Provide teaching via preferred learning methods  Outcome: Progressing

## 2022-03-28 ENCOUNTER — TELEPHONE (OUTPATIENT)
Dept: GASTROENTEROLOGY | Facility: CLINIC | Age: 87
End: 2022-03-28

## 2022-03-28 ENCOUNTER — PREP FOR PROCEDURE (OUTPATIENT)
Dept: GASTROENTEROLOGY | Facility: CLINIC | Age: 87
End: 2022-03-28

## 2022-03-28 ENCOUNTER — TRANSITIONAL CARE MANAGEMENT (OUTPATIENT)
Dept: INTERNAL MEDICINE CLINIC | Facility: CLINIC | Age: 87
End: 2022-03-28

## 2022-03-28 DIAGNOSIS — K80.50 CHOLEDOCHOLITHIASIS: Primary | ICD-10-CM

## 2022-03-28 NOTE — UTILIZATION REVIEW
Initial Clinical Review    Admission: Date/Time/Statement:   Admission Orders (From admission, onward)     Ordered        03/25/22 1358  [address] [facility],[address]  Once                      Orders Placed This Encounter   Procedures    INPATIENT ADMISSION     Standing Status:   Standing     Number of Occurrences:   1     Order Specific Question:   Level of Care     Answer:   Med Surg [16]     Order Specific Question:   Estimated length of stay     Answer:   More than 2 Midnights     Order Specific Question:   Certification     Answer:   I certify that inpatient services are medically necessary for this patient for a duration of greater than two midnights  See H&P and MD Progress Notes for additional information about the patient's course of treatment  ED Arrival Information     Expected Arrival Acuity    - 3/25/2022 08:54 Urgent         Means of arrival Escorted by Service Admission type    Walk-In Self Hospitalist Urgent         Arrival complaint    Abdominal Pain         Chief Complaint   Patient presents with    Abdominal Pain     patient reports abdominal pain, constipation, nausea, jaundice and periodic episodes of emesis over the past week  Hx  of bile duct blockage  Initial Presentation: 80 y o  male presented to ED from home as inpatient admission for choledocholithiasis  Patient c/o of abdominal pain x 3-4 days intermittent in RUQ  CT shows possible choledocholithiasis and he has transaminitis, constipation and jaundice and dark urine  He states the pain is better with postural changes and pressure on his back  The pain is worse with eating especially fatty foods  Poor appetite over last few days  Pmhx atrial flutter, AVR on Xarelto, HTN, HLD, biliary disease 25 years ago  On exam tender in right upper quadrant without rebound or guarding scleral icterus,and skin jaundice    Plan IVF, NPO,  ERCP today   FINDINGS:  · Limited views of the esophagus, stomach, duodenum and major papilla appeared normal  There were cratered ulcers present in the duodenal bulb and 2nd portion of the duodenum  · Deeply cannulated the common bile duct and pancreatic duct by employing double guidewire technique and using a traction sphincterotome  Used 260 cm x 0 035 in straight guidewire  Cannulation was difficult likely due to large obstructing stone in the distal duct  Injected contrast  Minimal contrast was injected revealing significant biliary dilation and large filling defect in the distal duct  · Performed medium major papilla sphincterotomy using a sphincterotome  No bleeding at the procedure site  Pus drained from the common bile duct after sphincterotomy  · Removed pus with 12 mm balloon in one sweep in the proximal common bile duct  Further sweeps and attempt to remove stone was not performed due to presence of pus within the common bile duct  Placed plastic, duodenal bend stent with length of 7 cm and diameter of 10 Fr in the minor papilla  IMPRESSION:  ERCP with sphincterotomy and sludge within the duct consistent with cholangitis  10 Fr x 7 cm straight duodenal bend plastic stent placed within the common bile duct       RECOMMENDATION:  Return to hospital [facility] for ongoing care  Hold Xarelto additional day  Patient to receive Zosyn post-operatively  Continue IV antibiotics after procedure  Follow LFTs  Will need repeat ERCP in 6-8 weeks for removal of stent and extraction of remaining choledocholithiasis once cholangitis resolves    03-26-22  80 M with choledocholithiasis and cholangitis s/p ERCP on 3/25,IV zosyn transition to oral antibiotics  Would hold Xarelto at least until Monday  He is adamant about leaving to take care of his demented wife         ED Triage Vitals   Temperature Pulse Respirations Blood Pressure SpO2   03/25/22 0904 03/25/22 0904 03/25/22 0904 03/25/22 0904 03/25/22 0904   98 3 °F (36 8 °C) 68 20 (!) 196/77 98 %      Temp Source Heart Rate Source Patient Position - Orthostatic VS BP Location FiO2 (%)   03/25/22 0904 03/25/22 0904 03/25/22 0904 03/25/22 0904 --   Oral Monitor Sitting Right arm       Pain Score       03/25/22 0924       3          Wt Readings from Last 1 Encounters:   03/25/22 91 6 kg (201 lb 15 1 oz)     Additional Vital Signs:   Date/Time Temp Pulse Resp BP MAP (mmHg) SpO2 O2 Device Patient Position - Orthostatic VS   03/26/22 07:38:02 98 4 °F (36 9 °C) 66 20 115/54 74 96 % None (Room air) Lying   03/25/22 21:07:49 98 3 °F (36 8 °C) 63 16 155/67 96 95 % -- --   03/25/22 2100 -- -- -- -- -- 98 % None (Room air) --   03/25/22 1747 -- 65 16 132/59 -- 97 % None (Room air) --   03/25/22 1732 -- 67 17 129/55 -- 97 % None (Room air) --   03/25/22 1613 99 3 °F (37 4 °C) 68 15 165/71 -- 99 % None (Room air) --   03/25/22 15:09:57 97 6 °F (36 4 °C) 66 17 155/68 97 96 % -- --   03/25/22 1443 -- 68 18 176/79 Abnormal  114 96 % None (Room air) Sitting   03/25/22 1239 -- 68 18 163/72 103 95 % None (Room air) Lying   03/25/22 1026 -- 70 18 179/75 Abnormal  -- 98 % None (Room air) Lying     Pertinent Labs/Diagnostic Test Results:   FL ERCP biliary and pancreatic   Final Result by Tammy Parisi MD (03/26 1053)      Fluoroscopic guidance for ERCP demonstrating biliary ductal dilatation and choledocholithiasis with placement of common bile duct stent  Please see ERCP report for further details  US right upper quadrant   1  Choledocholithiasis with suspected 1 cm calculus at the distal common bile duct  There is prominent intra and extra hepatic bile duct dilatation  2   Limited evaluation of the gallbladder which appears contracted  There is calcification as described above which may present either calcification of the gallbladder or gallstones  CT Abdomen pelvis with contrast   1  Massive intra and extrahepatic biliary ductal dilatation with small calcifications in the distal common bile duct possibly choledocholithiasis  The possibility of a calcified mass lesion difficult to exclude  Recommend right upper quadrant    ultrasound for further assessment  Further clinical assessment advised  2   Coarse, curvilinear calcifications associated with the gallbladder fundus, 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  The possibility of a lobulated intraluminal calculi in the nondistended fundus of the gallbladder is in the    differential diagnosis  Ultrasound may be able to further characterize this abnormality as well  3   Multifocal intermittent dilatation of the pancreatic duct admixed with areas of nondilatation possibly on the basis of an obstructing lesion in the region of the ampulla, potentially choledocholithiasis although underlying mass lesion should be    considered  4   Massive prostatic enlargement with marked bladder distention, trabeculation and bladder diverticula reflective of chronic urostasis     5   Diverticulosis coli         Results from last 7 days   Lab Units 03/26/22 0426 03/25/22  0922   WBC Thousand/uL 7 73 9 17   HEMOGLOBIN g/dL 12 1 12 6   HEMATOCRIT % 36 0* 37 4   PLATELETS Thousands/uL 177 195   NEUTROS ABS Thousands/µL 5 17 6 81         Results from last 7 days   Lab Units 03/26/22 0426 03/25/22  0922   SODIUM mmol/L 132* 136   POTASSIUM mmol/L 4 0 4 3   CHLORIDE mmol/L 100 99*   CO2 mmol/L 24 24   ANION GAP mmol/L 8 13   BUN mg/dL 12 14   CREATININE mg/dL 0 80 0 67   EGFR ml/min/1 73sq m 79 85   CALCIUM mg/dL 8 2* 9 2   MAGNESIUM mg/dL  --  2 1     Results from last 7 days   Lab Units 03/26/22 0426 03/25/22  1034 03/25/22  0922   AST U/L 166*  --  243*   ALT U/L 386*  --  502*   ALK PHOS U/L 252*  --  305*   TOTAL PROTEIN g/dL 6 0*  --  7 1   ALBUMIN g/dL 3 0*  --  3 7   TOTAL BILIRUBIN mg/dL 5 78*  --  9 14*   BILIRUBIN DIRECT mg/dL  --   --  7 49*   AMMONIA umol/L  --  20  --          Results from last 7 days   Lab Units 03/26/22  0426 03/25/22  0922   GLUCOSE RANDOM mg/dL 98 101     Results from last 7 days   Lab Units 03/25/22  8029 PROTIME seconds 14 7*   INR  1 18   PTT seconds 32     Results from last 7 days   Lab Units 03/25/22  0922   LIPASE u/L 238     Results from last 7 days   Lab Units 03/25/22  1028   CLARITY UA  Clear   COLOR UA  Yellow   SPEC GRAV UA  1 010   PH UA  6 0   GLUCOSE UA mg/dl Negative   KETONES UA mg/dl 40 (2+)*   BLOOD UA  Negative   PROTEIN UA mg/dl Negative   NITRITE UA  Negative   BILIRUBIN UA  Interference- unable to analyze*   UROBILINOGEN UA E U /dl 0 2   LEUKOCYTES UA  Negative     FL   ED Treatment:   Medication Administration from 03/25/2022 0854 to 03/25/2022 1511       Date/Time Order Dose Route Action     03/25/2022 0924 morphine (PF) 4 mg/mL injection 4 mg 4 mg Intravenous Given     03/25/2022 0923 sodium chloride 0 9 % bolus 1,000 mL 1,000 mL Intravenous New Bag     03/25/2022 1013 iohexol (OMNIPAQUE) 350 MG/ML injection (SINGLE-DOSE) 100 mL 100 mL Intravenous Given        Past Medical History:   Diagnosis Date    Acute bacterial prostatitis     last assessed: 5/29/15    Aortic valve stenosis     last assessed: 03/10/15    Coronary atherosclerosis     last assessed: 03/10/15    Left inguinal hernia     last assessed: 03/10/15    Mitral valve disorder     Status post laparoscopic hernia repair     last assessed: 04/14/15     Present on Admission:   Atrial flutter ([address] )   Hypertension      Admitting Diagnosis: Choledocholithiasis [K80 50]  Abdominal pain [R10 9]  Transaminitis [R74 01]  Choledocholithiasis with obstruction [K80 51]  Age/Sex: 80 y o  male  Admission Orders:  Scheduled Medications:  No current facility-administered medications for this encounter  Continuous IV Infusions:  No current facility-administered medications for this encounter  PRN Meds:  No current facility-administered medications for this encounter        IP CONSULT TO GASTROENTEROLOGY  IP CONSULT TO ACUTE CARE SURGERY    Network Utilization Review Department  ATTENTION: Please call with any questions or concerns to [phone] and carefully listen to the prompts so that you are directed to the right person  All voicemails are confidential   Eda Adebayo all requests for admission clinical reviews, approved or denied determinations and any other requests to dedicated fax number below belonging to the campus where the patient is receiving treatment   List of dedicated fax numbers for the Facilities:  [address] DENIALS (Administrative/Medical Necessity) [phone] [address] (Maternity/NICU/Pediatrics) [phone]   [address]  [address] [address] [place] [address] Roby Jacqueline Villaseñor [address] [place] [address] [phone]

## 2022-03-28 NOTE — TELEPHONE ENCOUNTER
----- Message from Jorge Hair MD sent at 3/25/2022  5:34 PM EDT -----  Pt had ercp with me at [facility] and found to have cholangitis  Placed stent  Repeat ERCP at [facility] in 6-8 weeks for choledocholithiasis   Needs to hold xarelto

## 2022-04-01 NOTE — TELEPHONE ENCOUNTER
Pt confirmed procedure       Scheduled date of ERCP(as of today):  5/17/2022  Physician performing ERCP: Dr Fall  Location of ERCP: [facility]  Instructions reviewed with patient by: Robyn/mailed  Clearances: Med Clearance faxed to Dr Fuentes

## 2022-04-05 ENCOUNTER — OFFICE VISIT (OUTPATIENT)
Dept: INTERNAL MEDICINE CLINIC | Facility: CLINIC | Age: 87
End: 2022-04-05
Payer: COMMERCIAL

## 2022-04-05 VITALS
TEMPERATURE: 96 F | HEIGHT: 73 IN | SYSTOLIC BLOOD PRESSURE: 112 MMHG | BODY MASS INDEX: 26.77 KG/M2 | DIASTOLIC BLOOD PRESSURE: 60 MMHG | OXYGEN SATURATION: 98 % | HEART RATE: 74 BPM | WEIGHT: 202 LBS | RESPIRATION RATE: 16 BRPM

## 2022-04-05 DIAGNOSIS — K82.8 PORCELAIN GALLBLADDER: ICD-10-CM

## 2022-04-05 DIAGNOSIS — K80.50 CHOLEDOCHOLITHIASIS: Primary | ICD-10-CM

## 2022-04-05 DIAGNOSIS — I48.3 TYPICAL ATRIAL FLUTTER (HCC): ICD-10-CM

## 2022-04-05 DIAGNOSIS — Z95.2 S/P AVR (AORTIC VALVE REPLACEMENT): ICD-10-CM

## 2022-04-05 PROCEDURE — 1111F DSCHRG MED/CURRENT MED MERGE: CPT | Performed by: INTERNAL MEDICINE

## 2022-04-05 PROCEDURE — 99495 TRANSJ CARE MGMT MOD F2F 14D: CPT | Performed by: INTERNAL MEDICINE

## 2022-04-05 NOTE — ASSESSMENT & PLAN NOTE
Presented to the ED on 3/25 with RUQ pain and jaundice  Imaging showed 1cm stone in the CBD  ERCP with sphincterotomy and sludge within the duct consistent with cholangitis  10 Fr x 7 cm straight duodenal bend plastic stent placed within the common bile duct  Calculus not removed due to cholangitis  Patient advised to return for repeat ERCP in 6-8 weeks for removal of stent and extraction of remaining choledocholithiasis once cholangitis resolves    Follow up appointment in May 2021

## 2022-04-05 NOTE — ASSESSMENT & PLAN NOTE
IV/VI systolic murmur with palpable thrill noted on exam today  S/p bioprosthetic AVR  Continue following up with cardiology  Continue anticoagulation with Xarelto

## 2022-04-05 NOTE — PROGRESS NOTES
Assessment and Plan:    Choledocholithiasis  Presented to the ED on 3/25 with RUQ pain and jaundice  Imaging showed 1cm stone in the CBD  ERCP with sphincterotomy and sludge within the duct consistent with cholangitis  10 Fr x 7 cm straight duodenal bend plastic stent placed within the common bile duct  Calculus not removed due to cholangitis  Patient advised to return for repeat ERCP in 6-8 weeks for removal of stent and extraction of remaining choledocholithiasis once cholangitis resolves  Follow up appointment in May 2021    Porcelain gallbladder  Patient aware that this is a pre-cancer condition  Follow up scheduled with general surgery for further management  Atrial flutter ([facility] [address] )  Patient noted to be bradycardic during this visit, currently asymptomatic  Was advised by his cardiologist he will likely need a pacemaker  Patient advised to continue following up with cardiology  Continue anticoagulation with Xarelto  S/P AVR (aortic valve replacement)  IV/VI systolic murmur with palpable thrill noted on exam today  S/p bioprosthetic AVR  Continue following up with cardiology  Continue anticoagulation with Xarelto  Subjective:     Patient ID: Harish Cabrera is a 80 y o  male  Patient presents today for transition of care visit after recent hospitalization  [facility] and [facility]  He was found to have choledocholithiasis with a 1cm calculus obstructing the distal CBD  While in the hospital, he underwent ERCP with sphincterotomy  Was started on antibiotics due to infectious-appearing sludge  He will need to undergo a repeat ERCP for stent removal and choledocholithiasis extraction  Patient was discharged on 3/26/22 because he had to go home and take care of his wife  He was transitioned from IV Zosyn to PO Augmentin and completed a total of 7 days of antibiotics treatment   Given findings of porcelain gallbladder, patient was also advised to undergo cholecystectomy and a follow up appointment was scheduled with Dr Otto Fang  Today patient feels well and has no complaints  He denies abdominal pain and states that his jaundice resolved  He also reports that his stools have darkened in color  On exam, patient is noted to be bradycardic, which he states is not new for him  He follows up with Cardiology regularly and was told that he will likely need pacemaker implanted soon  He states it although his heart rate is low, he is completely asymptomatic  We had a discussion about the risks of untreated bradycardia and possible symptoms to look out for which may prompt a visit to the emergency department  Patient was advised to continue following up with his cardiologist and discuss further management  Patient has no other complaints at this time  Review of Systems   Constitutional: Negative for activity change, appetite change, chills, fatigue, fever and unexpected weight change  HENT: Negative for congestion, postnasal drip, rhinorrhea, sinus pressure, sore throat and trouble swallowing  Eyes: Negative for photophobia and visual disturbance  Respiratory: Negative for cough, shortness of breath and wheezing  Cardiovascular: Positive for leg swelling  Negative for chest pain and palpitations  Gastrointestinal: Negative for abdominal distention, abdominal pain, constipation, diarrhea, nausea and vomiting  Endocrine: Negative for polydipsia and polyuria  Genitourinary: Negative for dysuria and hematuria  Musculoskeletal: Negative for arthralgias, back pain and myalgias  Skin: Negative for color change, pallor and rash  Neurological: Negative for dizziness, facial asymmetry, weakness, light-headedness, numbness and headaches  Psychiatric/Behavioral: Negative for agitation, behavioral problems and confusion  The patient is not nervous/anxious  All other systems reviewed and are negative         Patient Active Problem List   Diagnosis    Venous insufficiency    Hypertension    Hyperlipidemia    Erectile dysfunction of non-organic origin    S/P AVR (aortic valve replacement)    Presbyesophagus    Atrial flutter (HCC)    Current use of long term anticoagulation    History of sciatica    Choledocholithiasis    Porcelain gallbladder        Current Outpatient Medications   Medication Sig Dispense Refill    lisinopril (ZESTRIL) 10 mg tablet Take 1 tablet by mouth daily      Xarelto 20 MG tablet Take 20 mg by mouth daily after dinner       No current facility-administered medications for this visit  No Known Allergies     The following portions of the patient's history were reviewed and updated as appropriate: allergies, current medications, past family history, past medical history, past social history, past surgical history and problem list           Objective:    /60   Pulse 74   Temp (!) 96 °F (35 6 °C)   Resp 16   Ht 6' 1" (1 854 m)   Wt 91 6 kg (202 lb)   SpO2 98%   BMI 26 65 kg/m²      Body mass index is 26 65 kg/m²  Physical Exam  Vitals reviewed  Constitutional:       General: He is not in acute distress  Appearance: Normal appearance  He is well-developed  He is not ill-appearing, toxic-appearing or diaphoretic  HENT:      Head: Normocephalic and atraumatic  Nose: Nose normal       Mouth/Throat:      Mouth: Mucous membranes are moist    Eyes:      General: No scleral icterus  Right eye: No discharge  Left eye: No discharge  Extraocular Movements: Extraocular movements intact  Conjunctiva/sclera: Conjunctivae normal    Cardiovascular:      Rate and Rhythm: Bradycardia present  Rhythm irregular  Chest Wall: Thrill present  Pulses: Normal pulses  Heart sounds: Murmur heard  Systolic murmur is present with a grade of 4/6  Pulmonary:      Effort: Pulmonary effort is normal  No respiratory distress        Breath sounds: Normal breath sounds  No wheezing, rhonchi or rales  Abdominal:      General: Bowel sounds are normal  There is no distension  Palpations: Abdomen is soft  Tenderness: There is no abdominal tenderness  There is no guarding or rebound  Musculoskeletal:      Cervical back: Normal range of motion and neck supple  Right lower leg: Edema present  Left lower leg: Edema present  Comments: 1+ pitting edema noted on the left, 2+ pitting edema noted on the right  Patient states that this is not new for him  Denies any surgical interventions on his right leg  Skin:     General: Skin is warm and dry  Coloration: Skin is not jaundiced or pale  Neurological:      Mental Status: He is alert and oriented to person, place, and time  Psychiatric:         Mood and Affect: Mood normal          Behavior: Behavior normal          Thought Content:  Thought content normal          Judgment: Judgment normal

## 2022-04-05 NOTE — ASSESSMENT & PLAN NOTE
Patient noted to be bradycardic during this visit, currently asymptomatic  Was advised by his cardiologist he will likely need a pacemaker  Patient advised to continue following up with cardiology  Continue anticoagulation with Xarelto

## 2022-04-05 NOTE — ASSESSMENT & PLAN NOTE
Patient aware that this is a pre-cancer condition  Follow up scheduled with general surgery for further management

## 2022-04-08 NOTE — PROGRESS NOTES
Assessment/Plan:     Choledocholithiasis  Presented to the ED on 3/25 with RUQ pain and jaundice  Imaging showed 1cm stone in the CBD  ERCP with sphincterotomy and sludge within the duct consistent with cholangitis  10 Fr x 7 cm straight duodenal bend plastic stent placed within the common bile duct  Calculus not removed due to cholangitis  Patient advised to return for repeat ERCP in 6-8 weeks for removal of stent and extraction of remaining choledocholithiasis once cholangitis resolves  Follow up appointment in May 2021    Porcelain gallbladder  Patient aware that this is a pre-cancer condition  Follow up scheduled with general surgery for further management  Atrial flutter ([facility] [address] )  Patient noted to be bradycardic during this visit, currently asymptomatic  Was advised by his cardiologist he will likely need a pacemaker  Patient advised to continue following up with cardiology  Continue anticoagulation with Xarelto  S/P AVR (aortic valve replacement)  IV/VI systolic murmur with palpable thrill noted on exam today  S/p bioprosthetic AVR  Continue following up with cardiology  Continue anticoagulation with Xarelto  Subjective:     Patient ID: Valentina Solomon is a 80 y o  male  HPI    Review of Systems      Objective:     Physical Exam      Vitals:    04/05/22 1404   BP: 112/60   Pulse: 74   Resp: 16   Temp: (!) 96 °F (35 6 °C)   SpO2: 98%   Weight: 91 6 kg (202 lb)   Height: 6' 1" (1 854 m)       Transitional Care Management Review:  Valentina Solomon is a 80 y o  male here for TCM follow up       During the TCM phone call patient stated:    TCM Call (since 3/8/2022)     Date and time call was made  3/28/2022  1:33 PM    Hospital care reviewed  Records reviewed        Patient was hospitialized at  [address]        Date of Admission  03/25/22    Date of discharge  03/26/22    Diagnosis  Choledocholithiasis    Disposition  Home    Were the patients medications reviewed and updated  Yes Current Symptoms  None      TCM Call (since 3/8/2022)     Post hospital issues  None    Should patient be enrolled in anticoag monitoring? No    Scheduled for follow up?   Yes    Did you obtain your prescribed medications  Yes    Do you need help managing your prescriptions or medications  No    Is transportation to your appointment needed  No    I have advised the patient to call PCP with any new or worsening symptoms  [id number] [facility] or [facility] other    Support System  None    Are you recieving any outpatient services  No    Are you recieving home care services  No    Are you using any community resources  No    Current waiver services  No    Have you fallen in the last 12 months  No    Interperter language line needed  No    Counseling  Patient          Ad Vargas MA

## 2022-04-19 ENCOUNTER — CONSULT (OUTPATIENT)
Dept: SURGERY | Facility: CLINIC | Age: 87
End: 2022-04-19
Payer: COMMERCIAL

## 2022-04-19 VITALS
WEIGHT: 196 LBS | SYSTOLIC BLOOD PRESSURE: 122 MMHG | RESPIRATION RATE: 16 BRPM | DIASTOLIC BLOOD PRESSURE: 70 MMHG | HEART RATE: 40 BPM | TEMPERATURE: 96.3 F | HEIGHT: 73 IN | BODY MASS INDEX: 25.98 KG/M2

## 2022-04-19 DIAGNOSIS — Z79.01 CURRENT USE OF LONG TERM ANTICOAGULATION: ICD-10-CM

## 2022-04-19 DIAGNOSIS — R00.1 BRADYCARDIA: ICD-10-CM

## 2022-04-19 DIAGNOSIS — K82.8 PORCELAIN GALLBLADDER: Primary | ICD-10-CM

## 2022-04-19 DIAGNOSIS — I10 PRIMARY HYPERTENSION: ICD-10-CM

## 2022-04-19 DIAGNOSIS — R74.01 TRANSAMINITIS: ICD-10-CM

## 2022-04-19 DIAGNOSIS — K80.20 CALCULUS OF GALLBLADDER WITHOUT CHOLECYSTITIS WITHOUT OBSTRUCTION: ICD-10-CM

## 2022-04-19 DIAGNOSIS — Z95.2 S/P AVR (AORTIC VALVE REPLACEMENT): ICD-10-CM

## 2022-04-19 DIAGNOSIS — K80.50 CHOLEDOCHOLITHIASIS: ICD-10-CM

## 2022-04-19 PROCEDURE — 99214 OFFICE O/P EST MOD 30 MIN: CPT | Performed by: PHYSICIAN ASSISTANT

## 2022-04-19 PROCEDURE — 1160F RVW MEDS BY RX/DR IN RCRD: CPT | Performed by: PHYSICIAN ASSISTANT

## 2022-04-19 RX ORDER — SODIUM CHLORIDE, SODIUM LACTATE, POTASSIUM CHLORIDE, CALCIUM CHLORIDE 600; 310; 30; 20 MG/100ML; MG/100ML; MG/100ML; MG/100ML
125 INJECTION, SOLUTION INTRAVENOUS CONTINUOUS
Status: CANCELLED | OUTPATIENT
Start: 2022-05-23

## 2022-04-19 RX ORDER — CEFAZOLIN SODIUM 2 G/50ML
2000 SOLUTION INTRAVENOUS ONCE
Status: CANCELLED | OUTPATIENT
Start: 2022-05-23 | End: 2022-05-23

## 2022-04-19 NOTE — PROGRESS NOTES
Assessment/Plan:   Miguelina Zayas is a 80 y o male who is here for Gallbladder disease     Upon evaluation today patient has: Gallstones on Ultrasound and porcelain gallbladder    Plan:   1  ERCP 5/16 with GI  2  Laparoscopic Cholecystectomy with possible Intraoperative Cholangiogram  Possible Robotic assisted for porcelain gallbladder after ERCP - will confirm with Dr Yi Zafar he wants surgery after ERCP      Post Op Pain Management: Motrin, Tylenol and Vicodin      Ultrasound findings: 3/25/22  BILIARY:  The gallbladder is contracted and calcifications are noted as seen by recent CT  Uncertain if this represents calcification of the wall or gallstones  There is limited evaluation of the gallbladder  Negative sonographic Vicente's sign  Intrahepatic biliary dilatation  CBD measures 16 mm  There is choledocholithiasis  On the ultrasound there is calcification measuring approximate 1 centimeter and may represent a calculus in the distal common bile duct  Liver 15 7 cm      Preoperative Clearance: Medical and Cardiac anticoagulation recommendations           Images: 3/25/22: CT   Massive intra and extrahepatic biliary ductal dilatation with small calcifications in the distal common bile duct possibly choledocholithiasis  The possibility of a calcified mass lesion difficult to exclude  Recommend right upper quadrant   ultrasound for further assessment  Further clinical assessment advised  2   Coarse, curvilinear calcifications associated with the gallbladder fundus, 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  The possibility of a lobulated intraluminal calculi in the nondistended fundus of the gallbladder is in the   differential diagnosis  Ultrasound may be able to further characterize this abnormality as well    3   Multifocal intermittent dilatation of the pancreatic duct admixed with areas of nondilatation possibly on the basis of an obstructing lesion in the region of the ampulla, potentially choledocholithiasis although underlying mass lesion should be   considered  4   Massive prostatic enlargement with marked bladder distention, trabeculation and bladder diverticula reflective of chronic urostasis  5   Diverticulosis coli        ____________________________________________________    HPI:  Valentina Solomon is a 80 y o male who was referred for evaluation of There were no encounter diagnoses  ER/hospital course:  Presented to the ED on 3/25 with RUQ pain and jaundice  Imaging showed 1cm stone in the CBD  ERCP with sphincterotomy and sludge within the duct consistent with cholangitis  10 Fr x 7 cm straight duodenal bend plastic stent placed within the common bile duct  Calculus not removed due to cholangitis  Patient advised to return for repeat ERCP in 6-8 weeks for removal of stent and extraction of remaining choledocholithiasis once cholangitis resolves  Follow up appointment in May 2021  Noted to have abnormal calcification of the gallbladder consistent with porcelain gallbladder  Patient states his cardiologist does NOT want to put in  A pacemaker  Currently:   Abdominal pain Location: in the RUQ without radiation, which is Intermittent      no nausea and no vomiting,     regular bowel movement       Duration of pain or symptoms: over 2 years      Prior Colonoscopy : Unknown     Prior Abdominal Surgery: bilateral inguinal hernia repair and has had aortic valve replacement    Anticoagulation: Xaerelto     Imaging:   No orders to display           LABS:    Lab Results   Component Value Date     03/24/2015    K 4 0 03/26/2022     03/26/2022    CO2 24 03/26/2022    ANIONGAP 7 03/24/2015    BUN 12 03/26/2022    CREATININE 0 80 03/26/2022    GLUCOSE 100 03/24/2015    GLUF 93 01/23/2020    CALCIUM 8 2 (L) 03/26/2022    CORRECTEDCA 9 0 03/26/2022     (H) 03/26/2022     (H) 03/26/2022    ALKPHOS 252 (H) 03/26/2022    EGFR 79 03/26/2022       Lab Results Component Value Date    WBC 7 73 03/26/2022    HGB 12 1 03/26/2022    HCT 36 0 (L) 03/26/2022    MCV 94 03/26/2022     03/26/2022     Lab Results   Component Value Date    INR 1 18 03/25/2022    PROTIME 14 7 (H) 03/25/2022     No results found for: HGBA1C        ROS:  General ROS: negative for - chills, fatigue, fever or night sweats, weight loss  Respiratory ROS: no cough, shortness of breath, or wheezing  Cardiovascular ROS: no chest pain or dyspnea on exertion  Genito-Urinary ROS: no dysuria, trouble voiding, or hematuria  Musculoskeletal ROS: negative for - gait disturbance, joint pain or muscle pain  Neurological ROS: no TIA or stroke symptoms  Gastrointestinal ROS: See HPI   GI ROS: see HPI  Skin ROS: no new rashes or lesions   Lymphatic ROS: no new adenopathy noted by pt  GYN ROS: see HPI, no new GYN history or bleeding noted  Psy ROS: no new mental or behavioral disturbances       Patient Active Problem List   Diagnosis    Venous insufficiency    Hypertension    Hyperlipidemia    Erectile dysfunction of non-organic origin    S/P AVR (aortic valve replacement)    Presbyesophagus    Atrial flutter (HCC)    Current use of long term anticoagulation    History of sciatica    Choledocholithiasis    Porcelain gallbladder         Allergies:  Patient has no known allergies        Current Outpatient Medications:     lisinopril (ZESTRIL) 10 mg tablet, Take 1 tablet by mouth daily, Disp: , Rfl:     Xarelto 20 MG tablet, Take 20 mg by mouth daily after dinner, Disp: , Rfl:     Past Medical History:   Diagnosis Date    Acute bacterial prostatitis     last assessed: 5/29/15    Aortic valve stenosis     last assessed: 03/10/15    Coronary atherosclerosis     last assessed: 03/10/15    Left inguinal hernia     last assessed: 03/10/15    Mitral valve disorder     Status post laparoscopic hernia repair     last assessed: 04/14/15       Past Surgical History:   Procedure Laterality Date    AORTIC VALVE REPLACEMENT  02/2008    CATARACT EXTRACTION  09/08/2014    INGUINAL HERNIA REPAIR Right     2012    INGUINAL HERNIA REPAIR Left     2017    TONSILLECTOMY         Family History   Problem Relation Age of Onset    Leukemia Mother     Coronary artery disease Father     Leukemia Father     Coronary artery disease Other     Leukemia Other         reports that he quit smoking about 17 years ago  His smoking use included cigars and pipe  He quit after 20 00 years of use  He has never used smokeless tobacco  He reports previous alcohol use  He reports that he does not use drugs  Exam:   There were no vitals filed for this visit  PHYSICAL EXAM  General Appearance:    Alert, cooperative, no distress,    Head:    Normocephalic without obvious abnormality   Eyes:    PERRL, conjunctiva/corneas clear,       Neck:   Supple, no adenopathy, no JVD   Back:     Symmetric, no spinal or CVA tenderness   Lungs:     Clear to auscultation bilaterally, no wheezing or rhonchi   Heart:    irregular, bradycardia , systolic murmur grade 4/6, no murmur   Abdomen:     abdomen is soft without significant tenderness, masses, organomegaly or guarding liver is not enlarged, spleen is not enlarged  Extremities:   Extremities normal  No clubbing, cyanosis or edema   Psych:   Normal Affect, AOx3  Neurologic:  Skin:   CNII-XII intact  Strength symmetric, speech intact    Warm, dry, intact, no visible rashes or lesions      Informed consent for procedure was personally discussed, reviewed, and signed by Dr Mt Munoz  Discussion by Dr Mt Munoz was carried out regarding risks, benefits, and alternatives with the patient  Risks include but are not limited to:  bleeding, infection, and delayed wound healing or an open wound, pulmonary embolus, leaks from bowel or bile ducts or other viscus, transfusions, death  Discussed in further detail the more common complications and their rates of occurrence     was used if necessary  Patient expressed understanding of the issues discussed and wished/consented to proceed  All questions were answered by Dr Jadiel Ramos  Discussion performed between patient and the provider signing below       Signature:   Jesenia Elizondo    Date: 4/19/2022 Time: 11:44 AM

## 2022-04-27 ENCOUNTER — OFFICE VISIT (OUTPATIENT)
Dept: INTERNAL MEDICINE CLINIC | Facility: CLINIC | Age: 87
End: 2022-04-27
Payer: COMMERCIAL

## 2022-04-27 VITALS
BODY MASS INDEX: 26.24 KG/M2 | SYSTOLIC BLOOD PRESSURE: 120 MMHG | HEIGHT: 73 IN | TEMPERATURE: 97 F | WEIGHT: 198 LBS | DIASTOLIC BLOOD PRESSURE: 70 MMHG | RESPIRATION RATE: 16 BRPM | OXYGEN SATURATION: 99 % | HEART RATE: 52 BPM

## 2022-04-27 DIAGNOSIS — I10 PRIMARY HYPERTENSION: ICD-10-CM

## 2022-04-27 DIAGNOSIS — K80.50 CHOLEDOCHOLITHIASIS: ICD-10-CM

## 2022-04-27 DIAGNOSIS — K82.8 PORCELAIN GALLBLADDER: ICD-10-CM

## 2022-04-27 DIAGNOSIS — I48.3 TYPICAL ATRIAL FLUTTER (HCC): ICD-10-CM

## 2022-04-27 DIAGNOSIS — Z95.2 S/P AVR (AORTIC VALVE REPLACEMENT): ICD-10-CM

## 2022-04-27 DIAGNOSIS — Z00.00 MEDICARE ANNUAL WELLNESS VISIT, SUBSEQUENT: Primary | ICD-10-CM

## 2022-04-27 PROCEDURE — 1036F TOBACCO NON-USER: CPT | Performed by: INTERNAL MEDICINE

## 2022-04-27 PROCEDURE — 1170F FXNL STATUS ASSESSED: CPT | Performed by: INTERNAL MEDICINE

## 2022-04-27 PROCEDURE — 99214 OFFICE O/P EST MOD 30 MIN: CPT | Performed by: INTERNAL MEDICINE

## 2022-04-27 PROCEDURE — 1125F AMNT PAIN NOTED PAIN PRSNT: CPT | Performed by: INTERNAL MEDICINE

## 2022-04-27 PROCEDURE — G0439 PPPS, SUBSEQ VISIT: HCPCS | Performed by: INTERNAL MEDICINE

## 2022-04-27 PROCEDURE — 3288F FALL RISK ASSESSMENT DOCD: CPT | Performed by: INTERNAL MEDICINE

## 2022-04-27 PROCEDURE — 1160F RVW MEDS BY RX/DR IN RCRD: CPT | Performed by: INTERNAL MEDICINE

## 2022-04-27 NOTE — PROGRESS NOTES
Assessment and Plan:     Problem List Items Addressed This Visit        Digestive    Choledocholithiasis    Porcelain gallbladder       Cardiovascular and Mediastinum    Hypertension       Other    S/P AVR (aortic valve replacement)      Other Visit Diagnoses     Medicare annual wellness visit, subsequent    -  Primary           Preventive health issues were discussed with patient, and age appropriate screening tests were ordered as noted in patient's After Visit Summary  Personalized health advice and appropriate referrals for health education or preventive services given if needed, as noted in patient's After Visit Summary       History of Present Illness:     Patient presents for Medicare Annual Wellness visit    Patient Care Team:  Patrick Colvin MD as PCP - General (Internal Medicine)     Problem List:     Patient Active Problem List   Diagnosis    Venous insufficiency    Hypertension    Hyperlipidemia    Erectile dysfunction of non-organic origin    S/P AVR (aortic valve replacement)    Presbyesophagus    Atrial flutter (HCC)    Current use of long term anticoagulation    History of sciatica    Choledocholithiasis    Porcelain gallbladder      Past Medical and Surgical History:     Past Medical History:   Diagnosis Date    Acute bacterial prostatitis     last assessed: 5/29/15    Aortic valve stenosis     last assessed: 03/10/15    Coronary atherosclerosis     last assessed: 03/10/15    Left inguinal hernia     last assessed: 03/10/15    Mitral valve disorder     Status post laparoscopic hernia repair     last assessed: 04/14/15     Past Surgical History:   Procedure Laterality Date    AORTIC VALVE REPLACEMENT  02/2008    CATARACT EXTRACTION  09/08/2014    INGUINAL HERNIA REPAIR Right     2012    INGUINAL HERNIA REPAIR Left     2017    TONSILLECTOMY        Family History:     Family History   Problem Relation Age of Onset    Leukemia Mother     Coronary artery disease Father    Ol[place]rittny Rain Leukemia Father     Coronary artery disease Other     Leukemia Other       Social History:     Social History     Socioeconomic History    Marital status:      Spouse name: Not on file    Number of children: Not on file    Years of education: Not on file    Highest education level: Not on file   Occupational History    Not on file   Tobacco Use    Smoking status: Former Smoker     Years: 20 00     Types: Cigars, Pipe     Quit date: 2005     Years since quittin 3    Smokeless tobacco: Never Used   Vaping Use    Vaping Use: Never used   Substance and Sexual Activity    Alcohol use: Not Currently     Comment: rarely a beer    Drug use: Never    Sexual activity: Not on file   Other Topics Concern    Not on file   Social History Narrative    Not on file     Social Determinants of Health     Financial Resource Strain: Not on file   Food Insecurity: Not on file   Transportation Needs: Not on file   Physical Activity: Not on file   Stress: Not on file   Social Connections: Not on file   Intimate Partner Violence: Not on file   Housing Stability: Not on file      Medications and Allergies:     Current Outpatient Medications   Medication Sig Dispense Refill    lisinopril (ZESTRIL) 10 mg tablet Take 1 tablet by mouth daily      Xarelto 20 MG tablet Take 20 mg by mouth daily after dinner       No current facility-administered medications for this visit  No Known Allergies   Immunizations:     Immunization History   Administered Date(s) Administered    COVID-19 MODERNA VACC 0 5 ML IM 2021, 2021    H1N1, All Formulations 2010    INFLUENZA 10/22/2021    Influenza Split High Dose Preservative Free IM 10/16/2013, 10/08/2014, 2015, 11/10/2017    Influenza, high dose seasonal 0 7 mL 2018, 11/15/2019, 10/29/2020, 10/22/2021    Pneumococcal Polysaccharide PPV23 2019    Tdap 2019      Health Maintenance:      There are no preventive care reminders to display for this patient  Topic Date Due    COVID-19 Vaccine (3 - Booster for Moderna series) 07/22/2021      Medicare Health Risk Assessment:     There were no vitals taken for this visit  Health Risk Assessment:   Patient rates overall health as good  Patient feels that their physical health rating is slightly worse  Patient is satisfied with their life  Eyesight was rated as slightly worse  Hearing was rated as same  Patient feels that their emotional and mental health rating is same  Patients states they are never, rarely angry  Patient states they are never, rarely unusually tired/fatigued  Pain experienced in the last 7 days has been some  Patient's pain rating has been 2/10  Patient states that he has experienced weight loss or gain in last 6 months  Fall Risk Screening: In the past year, patient has experienced: no history of falling in past year      Home Safety:  Patient does not have trouble with stairs inside or outside of their home  Patient has working smoke alarms and has working carbon monoxide detector  Home safety hazards include: none  Nutrition:   Current diet is Low Saturated Fat and No Added Salt  Medications:   Patient is currently taking over-the-counter supplements  OTC medications include: Too many to list beyond Vit C and D; theyre in my chart from past visits  Patient is able to manage medications  Activities of Daily Living (ADLs)/Instrumental Activities of Daily Living (IADLs):   Walk and transfer into and out of bed and chair?: Yes  Dress and groom yourself?: Yes    Bathe or shower yourself?: Yes    Feed yourself?  Yes  Do your laundry/housekeeping?: Yes  Manage your money, pay your bills and track your expenses?: Yes  Make your own meals?: Yes    Do your own shopping?: Yes    Previous Hospitalizations:   Any hospitalizations or ED visits within the last 12 months?: Yes    How many hospitalizations have you had in the last year?: 1-2    Advance Care Planning:   Living will: Yes    Durable POA for healthcare: Yes    Advanced directive: Yes      Screening, Brief Intervention, and Referral to Treatment (SBIRT)    Screening  Typical number of drinks in a day: 0  Typical number of drinks in a week: 0  Interpretation: Low risk drinking behavior  AUDIT-C Screenin) How often did you have a drink containing alcohol in the past year? 2 to 4 times a month  2) How many drinks did you have on a typical day when you were drinking in the past year?  1 to 2  3) How often did you have 6 or more drinks on one occasion in the past year? never    AUDIT-C Score: 2  Interpretation: Score 0-3 (male): Negative screen for alcohol misuse    Single Item Drug Screening:  How often have you used an illegal drug (including marijuana) or a prescription medication for non-medical reasons in the past year? never    Single Item Drug Screen Score: 0  Interpretation: Negative screen for possible drug use disorder      Fiona Lopez MD

## 2022-04-27 NOTE — PROGRESS NOTES
Assessment/Plan:    1  Medicare annual wellness visit, subsequent     2  Choledocholithiasis  CBC and differential    Comprehensive metabolic panel   3  Porcelain gallbladder     4  Primary hypertension  Comprehensive metabolic panel   5  S/P AVR (aortic valve replacement)  Ambulatory Referral to Cardiology    CANCELED: Echo complete w/ contrast if indicated   6  Typical atrial flutter (HCC)          A&P Notes:  Review pre mission tests including labs an EKG, to be performed approximately 2 weeks prior to upcoming surgery on May 23rd  At this time, [place] Solares is medically stable  Next visit to this office will be in 2 months  Subjective:      Patient ID: Donna Cabrera is a 80 y o  male seen in follow-up today with last visit on April 5th  He saw general surgery on April 19th  [place] [facility], with subsequent plan for laparoscopic cholecystectomy on May 23rd at [facility] by Dr Omar Chávez  Ron Dinero is occasional mild right upper quadrant discomfort, never severe, occurring after meals  He is eating and smaller quantities and avoiding high fat foods  He has had no fever, chills, nausea, vomiting or change in bowel habits  He has been able to gain 4-5 lb since his hospital admission for gallbladder disease from March 25th to March 26  Ron Dinero has undergone aortic valve replacement in 2008, and has atrial flutter with mitral regurgitation with mild mitral stenosis  He is actively followed by the [address] [place] [address] Drive  His last regular visit was March 10th at which time he was found to be stable  I did review the note  Last 2D echocardiogram was in 2021 with ejection fraction of 60%  Abdominal continues to have no dyspnea, no palpitations or lightheadedness, no chest pain  He remains on long-term anticoagulant therapy with Xarelto without clinical bleeding        Yesenia Delcid reports that the [address] [place] [address] Drive was consulted for cardiac clearance his general surgeon and that they have cleared him for surgery  Xarelto is to be held 2 days prior to surgery  CBC and CMP have been ordered for 2 weeks prior to surgery  EKG is to be performed at that time  Hypertension is optimally controlled  Radha Rowland 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   He will be receiving assistance around the time of surgery from his  sister            Past Medical History:   Diagnosis Date    Acute bacterial prostatitis     last assessed: 5/29/15    Aortic valve stenosis     last assessed: 03/10/15    Coronary atherosclerosis     last assessed: 03/10/15    Left inguinal hernia     last assessed: 03/10/15    Mitral valve disorder     Status post laparoscopic hernia repair     last assessed: 04/14/15        Family History   Problem Relation Age of Onset    Leukemia Mother     Coronary artery disease Father     Leukemia Father     Coronary artery disease Other     Leukemia Other         Social History     Socioeconomic History    Marital status:      Spouse name: Not on file    Number of children: Not on file    Years of education: Not on file    Highest education level: Not on file   Occupational History    Not on file   Tobacco Use    Smoking status: Former Smoker     Years: 20 00     Types: Cigars, Pipe     Quit date: 2005     Years since quittin 3    Smokeless tobacco: Never Used   Vaping Use    Vaping Use: Never used   Substance and Sexual Activity    Alcohol use: Not Currently     Comment: rarely a beer    Drug use: Never    Sexual activity: Not on file   Other Topics Concern    Not on file   Social History Narrative    Not on file     Social Determinants of Health     Financial Resource Strain: Not on file   Food Insecurity: Not on file   Transportation Needs: Not on file   Physical Activity: Not on file   Stress: Not on file   Social Connections: Not on file   Intimate Partner Violence: Not on file   Housing Stability: Not on file        No Known Allergies     Current Outpatient Medications   Medication Sig Dispense Refill    lisinopril (ZESTRIL) 10 mg tablet Take 1 tablet by mouth daily      Xarelto 20 MG tablet Take 20 mg by mouth daily after dinner       No current facility-administered medications for this visit  Review of Systems   Constitutional: Negative for activity change, appetite change and unexpected weight change  HENT: Negative  Respiratory: Negative  Cardiovascular: Positive for leg swelling  Negative for chest pain and palpitations  No change in chronic edema, right leg greater than left leg with control with support stockings  Genitourinary: Negative  Musculoskeletal: Negative  Skin: Negative  Neurological: Negative  Hematological: Does not bruise/bleed easily  Psychiatric/Behavioral: Negative  Objective:      /70   Pulse (!) 52   Temp (!) 97 °F (36 1 °C)   Resp 16   Ht 6' 1" (1 854 m)   Wt 89 8 kg (198 lb)   SpO2 99%   BMI 26 12 kg/m²      Wt Readings from Last 3 Encounters:   04/27/22 89 8 kg (198 lb)   04/19/22 88 9 kg (196 lb)   04/05/22 91 6 kg (202 lb)     Temp Readings from Last 3 Encounters:   04/27/22 (!) 97 °F (36 1 °C)   04/19/22 (!) 96 3 °F (35 7 °C)   04/05/22 (!) 96 °F (35 6 °C)     BP Readings from Last 3 Encounters:   04/27/22 120/70   04/19/22 122/70   04/05/22 112/60     Pulse Readings from Last 3 Encounters:   04/27/22 (!) 52   04/19/22 (!) 40   04/05/22 74       Physical Exam   VSS, afebrile, pulse irregularly irregular    Alert and Oriented in NAD    HEENT: NCAT, Pupils equal, 3 mm, EOEMI, no icterus or pallor, no iritis or conjunctivitis  No head or neck mass or adenopathy       Ears:  normal-appearing external auditory canals and tympanic membranes,     Nose: patent nasal passages without polyps or masses, no septal deviation, no nasal deformity,     Oral cavity and throat: normal dentition, no gum disease, normal mucosa, patent airway, no hemorrhages or exudates in the throat  Exam of salivary glands and ducts are normal  No submandibular or submental adenopathy  There are no dentures  Neck: supple, no trauma or tenderness  No JVD  Normal carotid upstroke and descent without bruits  Trachea midline without stridor  Thyroid exam normal on inspection and palpation  No spinal tenderness, full range of motion  Lymphatics: no adenopathy throughout    Chest:  No trauma or deformity, no supraclavicular adenopathy or bruit  Chest wall expansion is symmetric and normal, expiratory phase is not prolonged  Heart:  Irregularly irregular rhythm, normal S1-S2, no S4 or S3, no rub, grade 3 holosystolic murmur heard throughout precordium and loudest over the cardiac apex, blowing in quality, no diastolic murmur is appreciated  Lungs:  Clear to auscultation and normal to percussion  Back:  No trauma or deformity, no CVA mass or CVA tenderness  Skin:  No rash or skin malignancy  Abdomen:  Nondistended, normal bowel sounds, soft nontender without masses bruits organomegaly  There is no hernia  Extremities:  Arterial circulation is symmetrically normal with no clubbing or cyanosis  There is + 1 edema of left lower leg and +2 edema of right lower leg, there is no calf tenderness or phlebitic findings  There is no skin breakdown  Joints:  No deformity, swelling, redness, tenderness or increased temperature, no instability  There are no tophi  Affect:  Normal    Neurologic:  Normal and stable gait, and otherwise no focal findings as well  Cognitive: Intact              I have reviewed pertinent labs:  CBC:   Lab Results   Component Value Date    WBC 7 73 03/26/2022    RBC 3 85 (L) 03/26/2022    HGB 12 1 03/26/2022    HCT 36 0 (L) 03/26/2022    MCV 94 03/26/2022     03/26/2022    MCH 31 4 03/26/2022    MCHC 33 6 03/26/2022    RDW 14 5 03/26/2022    MPV 10 6 03/26/2022    NEUTROABS 5 17 03/26/2022     CMP:   Lab Results   Component Value Date    SODIUM 132 (L) 03/26/2022    K 4 0 03/26/2022     03/26/2022    CO2 24 03/26/2022    AGAP 8 03/26/2022    BUN 12 03/26/2022    CREATININE 0 80 03/26/2022    GLUC 98 03/26/2022    GLUF 93 01/23/2020    CALCIUM 8 2 (L) 03/26/2022     (H) 03/26/2022     (H) 03/26/2022    ALKPHOS 252 (H) 03/26/2022    TP 6 0 (L) 03/26/2022    ALB 3 0 (L) 03/26/2022    TBILI 5 78 (H) 03/26/2022    EGFR 79 03/26/2022     Urinalysis   Lab Results   Component Value Date    COLORU Yellow 03/25/2022    CLARITYU Clear 03/25/2022    SPECGRAV 1 010 03/25/2022    PHUR 6 0 03/25/2022    LEUKOCYTESUR Negative 03/25/2022    NITRITE Negative 03/25/2022    PROTEIN UA Negative 03/25/2022    GLUCOSEU Negative 03/25/2022    KETONESU 40 (2+) (A) 03/25/2022    UROBILINOGEN 0 2 03/25/2022    BILIRUBINUR Interference- unable to analyze (A) 03/25/2022    BLOODU Negative 03/25/2022    RBCUA 0-1 (A) 01/23/2020    WBCUA None Seen 01/23/2020    EPIS Occasional 01/23/2020    BACTERIA Occasional 01/23/2020

## 2022-04-27 NOTE — PATIENT INSTRUCTIONS

## 2022-05-06 ENCOUNTER — HOSPITAL ENCOUNTER (OUTPATIENT)
Dept: NON INVASIVE DIAGNOSTICS | Facility: HOSPITAL | Age: 87
Discharge: HOME | End: 2022-05-06
Payer: COMMERCIAL

## 2022-05-06 DIAGNOSIS — K82.8 PORCELAIN GALLBLADDER: ICD-10-CM

## 2022-05-06 DIAGNOSIS — K80.20 CALCULUS OF GALLBLADDER WITHOUT CHOLECYSTITIS WITHOUT OBSTRUCTION: ICD-10-CM

## 2022-05-06 LAB
ATRIAL RATE: 256 BPM
ATRIAL RATE: 258 BPM
P AXIS: 257 DEGREES
P AXIS: 266 DEGREES
QRS AXIS: -60 DEGREES
QRS AXIS: -62 DEGREES
QRSD INTERVAL: 178 MS
QRSD INTERVAL: 182 MS
QT INTERVAL: 544 MS
QT INTERVAL: 546 MS
QTC INTERVAL: 397 MS
QTC INTERVAL: 434 MS
T WAVE AXIS: -1 DEGREES
T WAVE AXIS: 10 DEGREES
VENTRICULAR RATE: 32 BPM
VENTRICULAR RATE: 38 BPM

## 2022-05-06 PROCEDURE — 93005 ELECTROCARDIOGRAM TRACING: CPT

## 2022-05-06 PROCEDURE — 93010 ELECTROCARDIOGRAM REPORT: CPT | Performed by: INTERNAL MEDICINE

## 2022-05-17 ENCOUNTER — HOSPITAL ENCOUNTER (OUTPATIENT)
Dept: GASTROENTEROLOGY | Facility: HOSPITAL | Age: 87
Setting detail: OUTPATIENT SURGERY
Discharge: HOME | End: 2022-05-17
Attending: INTERNAL MEDICINE
Payer: COMMERCIAL

## 2022-05-17 ENCOUNTER — ANESTHESIA EVENT (OUTPATIENT)
Dept: GASTROENTEROLOGY | Facility: HOSPITAL | Age: 87
End: 2022-05-17

## 2022-05-17 ENCOUNTER — HOSPITAL ENCOUNTER (OUTPATIENT)
Dept: RADIOLOGY | Facility: HOSPITAL | Age: 87
Discharge: HOME | End: 2022-05-17
Payer: COMMERCIAL

## 2022-05-17 ENCOUNTER — ANESTHESIA (OUTPATIENT)
Dept: GASTROENTEROLOGY | Facility: HOSPITAL | Age: 87
End: 2022-05-17

## 2022-05-17 VITALS
DIASTOLIC BLOOD PRESSURE: 60 MMHG | RESPIRATION RATE: 16 BRPM | OXYGEN SATURATION: 97 % | SYSTOLIC BLOOD PRESSURE: 127 MMHG | HEART RATE: 35 BPM | TEMPERATURE: 96.4 F

## 2022-05-17 DIAGNOSIS — K80.50 CHOLEDOCHOLITHIASIS: ICD-10-CM

## 2022-05-17 PROCEDURE — 43264 ERCP REMOVE DUCT CALCULI: CPT | Performed by: INTERNAL MEDICINE

## 2022-05-17 PROCEDURE — 43275 ERCP REMOVE FORGN BODY DUCT: CPT | Performed by: INTERNAL MEDICINE

## 2022-05-17 PROCEDURE — C1769 GUIDE WIRE: HCPCS

## 2022-05-17 PROCEDURE — 74328 X-RAY BILE DUCT ENDOSCOPY: CPT

## 2022-05-17 PROCEDURE — 43262 ENDO CHOLANGIOPANCREATOGRAPH: CPT | Performed by: INTERNAL MEDICINE

## 2022-05-17 RX ORDER — METOCLOPRAMIDE HYDROCHLORIDE 5 MG/ML
10 INJECTION INTRAMUSCULAR; INTRAVENOUS ONCE AS NEEDED
Status: CANCELLED | OUTPATIENT
Start: 2022-05-17

## 2022-05-17 RX ORDER — LABETALOL HYDROCHLORIDE 5 MG/ML
10 INJECTION, SOLUTION INTRAVENOUS
Status: CANCELLED | OUTPATIENT
Start: 2022-05-17

## 2022-05-17 RX ORDER — LIDOCAINE HYDROCHLORIDE 10 MG/ML
INJECTION, SOLUTION EPIDURAL; INFILTRATION; INTRACAUDAL; PERINEURAL AS NEEDED
Status: DISCONTINUED | OUTPATIENT
Start: 2022-05-17 | End: 2022-05-17

## 2022-05-17 RX ORDER — PROPOFOL 10 MG/ML
INJECTION, EMULSION INTRAVENOUS AS NEEDED
Status: DISCONTINUED | OUTPATIENT
Start: 2022-05-17 | End: 2022-05-17

## 2022-05-17 RX ORDER — ROCURONIUM BROMIDE 10 MG/ML
INJECTION, SOLUTION INTRAVENOUS AS NEEDED
Status: DISCONTINUED | OUTPATIENT
Start: 2022-05-17 | End: 2022-05-17

## 2022-05-17 RX ORDER — SODIUM CHLORIDE, SODIUM LACTATE, POTASSIUM CHLORIDE, CALCIUM CHLORIDE 600; 310; 30; 20 MG/100ML; MG/100ML; MG/100ML; MG/100ML
125 INJECTION, SOLUTION INTRAVENOUS CONTINUOUS
Status: CANCELLED | OUTPATIENT
Start: 2022-05-17

## 2022-05-17 RX ORDER — ONDANSETRON 2 MG/ML
INJECTION INTRAMUSCULAR; INTRAVENOUS AS NEEDED
Status: DISCONTINUED | OUTPATIENT
Start: 2022-05-17 | End: 2022-05-17

## 2022-05-17 RX ORDER — SODIUM CHLORIDE, SODIUM LACTATE, POTASSIUM CHLORIDE, CALCIUM CHLORIDE 600; 310; 30; 20 MG/100ML; MG/100ML; MG/100ML; MG/100ML
INJECTION, SOLUTION INTRAVENOUS CONTINUOUS PRN
Status: DISCONTINUED | OUTPATIENT
Start: 2022-05-17 | End: 2022-05-17

## 2022-05-17 RX ORDER — FENTANYL CITRATE/PF 50 MCG/ML
25 SYRINGE (ML) INJECTION
Status: CANCELLED | OUTPATIENT
Start: 2022-05-17

## 2022-05-17 RX ORDER — GLYCOPYRROLATE 0.2 MG/ML
INJECTION INTRAMUSCULAR; INTRAVENOUS AS NEEDED
Status: DISCONTINUED | OUTPATIENT
Start: 2022-05-17 | End: 2022-05-17

## 2022-05-17 RX ORDER — ALBUMIN, HUMAN INJ 5% 5 %
SOLUTION INTRAVENOUS CONTINUOUS PRN
Status: DISCONTINUED | OUTPATIENT
Start: 2022-05-17 | End: 2022-05-17

## 2022-05-17 RX ORDER — HYDROMORPHONE HCL IN WATER/PF 6 MG/30 ML
0.2 PATIENT CONTROLLED ANALGESIA SYRINGE INTRAVENOUS
Status: CANCELLED | OUTPATIENT
Start: 2022-05-17

## 2022-05-17 RX ORDER — ALBUTEROL SULFATE 2.5 MG/3ML
2.5 SOLUTION RESPIRATORY (INHALATION) ONCE AS NEEDED
Status: CANCELLED | OUTPATIENT
Start: 2022-05-17

## 2022-05-17 RX ORDER — HYDRALAZINE HYDROCHLORIDE 20 MG/ML
5 INJECTION INTRAMUSCULAR; INTRAVENOUS
Status: CANCELLED | OUTPATIENT
Start: 2022-05-17

## 2022-05-17 RX ORDER — ONDANSETRON 2 MG/ML
4 INJECTION INTRAMUSCULAR; INTRAVENOUS ONCE AS NEEDED
Status: CANCELLED | OUTPATIENT
Start: 2022-05-17

## 2022-05-17 RX ORDER — DEXAMETHASONE SODIUM PHOSPHATE 10 MG/ML
INJECTION, SOLUTION INTRAMUSCULAR; INTRAVENOUS AS NEEDED
Status: DISCONTINUED | OUTPATIENT
Start: 2022-05-17 | End: 2022-05-17

## 2022-05-17 RX ORDER — EPHEDRINE SULFATE 50 MG/ML
INJECTION INTRAVENOUS AS NEEDED
Status: DISCONTINUED | OUTPATIENT
Start: 2022-05-17 | End: 2022-05-17

## 2022-05-17 RX ORDER — FENTANYL CITRATE 50 UG/ML
INJECTION, SOLUTION INTRAMUSCULAR; INTRAVENOUS AS NEEDED
Status: DISCONTINUED | OUTPATIENT
Start: 2022-05-17 | End: 2022-05-17

## 2022-05-17 RX ORDER — PROMETHAZINE HYDROCHLORIDE 25 MG/ML
12.5 INJECTION, SOLUTION INTRAMUSCULAR; INTRAVENOUS ONCE AS NEEDED
Status: CANCELLED | OUTPATIENT
Start: 2022-05-17

## 2022-05-17 RX ORDER — HYDROMORPHONE HCL/PF 1 MG/ML
0.5 SYRINGE (ML) INJECTION
Status: CANCELLED | OUTPATIENT
Start: 2022-05-17

## 2022-05-17 RX ADMIN — ROCURONIUM BROMIDE 10 MG: 50 INJECTION INTRAVENOUS at 09:32

## 2022-05-17 RX ADMIN — ALBUMIN (HUMAN): 12.5 INJECTION, SOLUTION INTRAVENOUS at 09:47

## 2022-05-17 RX ADMIN — IOHEXOL 38 ML: 240 INJECTION, SOLUTION INTRATHECAL; INTRAVASCULAR; INTRAVENOUS; ORAL at 09:15

## 2022-05-17 RX ADMIN — SODIUM CHLORIDE, SODIUM LACTATE, POTASSIUM CHLORIDE, AND CALCIUM CHLORIDE: .6; .31; .03; .02 INJECTION, SOLUTION INTRAVENOUS at 08:51

## 2022-05-17 RX ADMIN — LIDOCAINE HYDROCHLORIDE 50 MG: 10 INJECTION, SOLUTION EPIDURAL; INFILTRATION; INTRACAUDAL; PERINEURAL at 08:59

## 2022-05-17 RX ADMIN — DEXAMETHASONE SODIUM PHOSPHATE 10 MG: 10 INJECTION, SOLUTION INTRAMUSCULAR; INTRAVENOUS at 09:08

## 2022-05-17 RX ADMIN — ROCURONIUM BROMIDE 30 MG: 50 INJECTION INTRAVENOUS at 08:59

## 2022-05-17 RX ADMIN — EPHEDRINE SULFATE 10 MG: 50 INJECTION INTRAVENOUS at 09:04

## 2022-05-17 RX ADMIN — GLYCOPYRROLATE 0.1 MG: 0.2 INJECTION, SOLUTION INTRAMUSCULAR; INTRAVENOUS at 09:08

## 2022-05-17 RX ADMIN — PROPOFOL 100 MG: 10 INJECTION, EMULSION INTRAVENOUS at 08:59

## 2022-05-17 RX ADMIN — SUGAMMADEX 180 MG: 100 INJECTION, SOLUTION INTRAVENOUS at 09:52

## 2022-05-17 RX ADMIN — FENTANYL CITRATE 100 MCG: 50 INJECTION INTRAMUSCULAR; INTRAVENOUS at 08:59

## 2022-05-17 RX ADMIN — GLYCOPYRROLATE 0.1 MG: 0.2 INJECTION, SOLUTION INTRAMUSCULAR; INTRAVENOUS at 09:15

## 2022-05-17 RX ADMIN — EPHEDRINE SULFATE 10 MG: 50 INJECTION INTRAVENOUS at 09:06

## 2022-05-17 RX ADMIN — ONDANSETRON 4 MG: 2 INJECTION INTRAMUSCULAR; INTRAVENOUS at 09:08

## 2022-05-17 NOTE — ANESTHESIA POSTPROCEDURE EVALUATION
Post-Op Assessment Note    CV Status:  Stable  Pain Score: 0    Pain management: adequate     Mental Status:  Alert and awake   Hydration Status:  Euvolemic and stable   PONV Controlled:  Controlled   Airway Patency:  Patent and adequate      Post Op Vitals Reviewed: Yes      Staff: CRNA         No complications documented      /62 (05/17/22 1007)    Temp (!) 96 4 °F (35 8 °C) (05/17/22 1007)    Pulse (!) 41 (05/17/22 1007)   Resp 18 (05/17/22 1007)    SpO2 99 % (05/17/22 1007)

## 2022-05-17 NOTE — H&P
History and Physical -  [facility]  Marivel King 80 y o  male MRN: [id number]    HPI: Marivel King is a 80y o  year old male who presents for ERCP  Patient last underwent ERCP in 2022  Underwent ERCP with sphincterotomy with noted sludge within the duct consistent with cholangitis  [facility] [place] by 7 cm straight duodenal bend plastic stent was placed within the CBD  Plan for removal of stent extraction of any remaining choledocholithiasis  REVIEW OF SYSTEMS: Per the HPI, and otherwise unremarkable      Historical Information   Past Medical History:   Diagnosis Date    Acute bacterial prostatitis     last assessed: 5/29/15    Aortic valve stenosis     last assessed: 03/10/15    Coronary atherosclerosis     last assessed: 03/10/15    Left inguinal hernia     last assessed: 03/10/15    Mitral valve disorder     Status post laparoscopic hernia repair     last assessed: 04/14/15     Past Surgical History:   Procedure Laterality Date    AORTIC VALVE REPLACEMENT  2008    CATARACT EXTRACTION  2014    INGUINAL HERNIA REPAIR Right     2012    INGUINAL HERNIA REPAIR Left     2017    TONSILLECTOMY       Social History   Social History     Substance and Sexual Activity   Alcohol Use Not Currently    Comment: rarely a beer     Social History     Substance and Sexual Activity   Drug Use Never     Social History     Tobacco Use   Smoking Status Former Smoker    Years: 20 00    Types: [de-identified], Pipe    Quit date: 2005    Years since quittin 3   Smokeless Tobacco Never Used     Family History   Problem Relation Age of Onset    Leukemia Mother     Coronary artery disease Father     Leukemia Father     Coronary artery disease Other     Leukemia Other        Meds/Allergies     (Not in a hospital admission)      No Known Allergies    Objective     /69   Pulse (!) 44   Temp 97 8 °F (36 6 °C) (Oral)   Resp 20   SpO2 98%       PHYSICAL EXAM    Gen: NAD  CV: RRR  CHEST: Clear  ABD: soft, NT/ND  EXT: no edema      ASSESSMENT/PLAN:  This is a 80y o  year old male here for ERCP, and he is stable and optimized for his procedure

## 2022-05-17 NOTE — ANESTHESIA PREPROCEDURE EVALUATION
Procedure:  ERCP    Relevant Problems   CARDIO   (+) Atrial flutter (HCC)   (+) Hyperlipidemia   (+) Hypertension   (+) S/P AVR (aortic valve replacement)      NEURO/PSYCH   (+) History of sciatica      Digestive   (+) Choledocholithiasis      Other   (+) Current use of long term anticoagulation     TTE in 2021: Normal LV function  Mild AR and MR     Physical Exam    Airway    Mallampati score: II  TM Distance: >3 FB  Neck ROM: full     Dental   Comment: Chipped teeth  No loose,     Cardiovascular  Rhythm: irregular, Rate: abnormal, Murmur,     Pulmonary  Pulmonary exam normal Breath sounds clear to auscultation,     Other Findings        Anesthesia Plan  ASA Score- 3     Anesthesia Type- general with ASA Monitors  Additional Monitors:   Airway Plan: ETT  Plan Factors-Exercise tolerance (METS): >4 METS  Chart reviewed  EKG reviewed  Existing labs reviewed  Patient summary reviewed  Patient is not a current smoker  Induction- intravenous  Postoperative Plan- Plan for postoperative opioid use  Planned trial extubation    Informed Consent- Anesthetic plan and risks discussed with patient  I personally reviewed this patient with the CRNA  Discussed and agreed on the Anesthesia Plan with the CRNA  Jose Stevens

## 2022-05-18 RX ORDER — FUROSEMIDE 20 MG/1
20 TABLET ORAL DAILY PRN
COMMUNITY

## 2022-05-18 NOTE — PRE-PROCEDURE INSTRUCTIONS
Pre-Surgery Instructions:   Medication Instructions    Ascorbic Acid (VITAMIN C PO) stop 5/18    Cholecalciferol (VITAMIN D3 PO) stop 5/18    Coenzyme Q10 (CO Q-10 PO) stop 5/18    furosemide (LASIX) 20 mg tablet Uses PRN- DO NOT take day of surgery    GRAPE SEED EXTRACT PO stop 5/18    lisinopril (ZESTRIL) 10 mg tablet Hold day of surgery   MAGNESIUM PO stop 5/18    Misc Natural Products (DAILY HERBS LEG VEIN/CIRCULATI PO) stop 5/18    Misc Natural Products (OSTEO BI-FLEX JOINT SHIELD PO) stop 5/18    Multiple Vitamins-Minerals (EYE VITAMINS PO) stop 5/18    Multiple Vitamins-Minerals (ZINC PO) stop 5/18    NATTOKINASE PO stop 5/18    Nutritional Supplements (DHEA PO) stop 5/18    Probiotic Product (PROBIOTIC PO) stop 5/18    QUERCETIN PO stop 5/18    Red Yeast Rice Extract (RED YEAST RICE PO) stop 5/18    RESVERATROL PO stop 5/18    RUTIN PO stop 5/18    SELENIUM PO stop 5/18    Thiamine HCl (VITAMIN B-1 PO) stop 5/18    TURMERIC CURCUMIN PO stop 5/18    Xarelto 20 MG tablet Instructions provided by MD stop 5/21   Covid screening negative as per patient  Reviewed pre op medicine and showering instructions with patient via phone call, verbalizes understanding  Advised patient to stop taking non prescribed vitamins, herbal meds and ASA as of 5/18  Advised to not take NSAID's 3 days pre op but may take Tylenol products if needed  Advised to take DOS medicine with a small sip water  Advised NPO after MN prior to surgery and surgical services will call (5/20) with scheduled time of hospital arrival     Pt verbalized understanding of all instructions

## 2022-05-20 ENCOUNTER — ANESTHESIA EVENT (OUTPATIENT)
Dept: PERIOP | Facility: HOSPITAL | Age: 87
End: 2022-05-20
Payer: COMMERCIAL

## 2022-05-22 NOTE — ANESTHESIA PREPROCEDURE EVALUATION
Procedure:  CHOLECYSTECTOMY LAPAROSCOPIC (N/A Abdomen)    Relevant Problems   CARDIO   (+) Atrial flutter (HCC)   (+) Hyperlipidemia   (+) Hypertension   (+) S/P AVR (aortic valve replacement)   (+) Venous insufficiency      NEURO/PSYCH   (+) History of sciatica      Digestive   (+) Choledocholithiasis      Other   (+) Current use of long term anticoagulation    Echo in 2021 reported nornmal LV function with mild AR and MR and no evidence of stenosis of the bio AVR     Physical Exam    Airway    Mallampati score: II  TM Distance: >3 FB  Neck ROM: full     Dental   Comment: Prominent incisors, No notable dental hx     Cardiovascular  Rhythm: regular, Rate: normal, Murmur,     Pulmonary  Breath sounds clear to auscultation,     Other Findings        Anesthesia Plan  ASA Score- 3     Anesthesia Type-         Additional Monitors:   Airway Plan: ETT  Plan Factors-Exercise tolerance (METS): >4 METS  Chart reviewed  Patient is not a current smoker  Obstructive sleep apnea risk education given perioperatively  Induction- intravenous  Postoperative Plan- Plan for postoperative opioid use  Informed Consent- Anesthetic plan and risks discussed with patient

## 2022-05-23 ENCOUNTER — ANESTHESIA (OUTPATIENT)
Dept: PERIOP | Facility: HOSPITAL | Age: 87
End: 2022-05-23
Payer: COMMERCIAL

## 2022-05-23 ENCOUNTER — HOSPITAL ENCOUNTER (OUTPATIENT)
Facility: HOSPITAL | Age: 87
Setting detail: OUTPATIENT SURGERY
Discharge: HOME | End: 2022-05-24
Attending: SURGERY | Admitting: SURGERY
Payer: COMMERCIAL

## 2022-05-23 DIAGNOSIS — K80.50 CHOLEDOCHOLITHIASIS: Primary | ICD-10-CM

## 2022-05-23 DIAGNOSIS — K82.8 PORCELAIN GALLBLADDER: ICD-10-CM

## 2022-05-23 LAB
ALBUMIN SERPL BCP-MCNC: 2.8 G/DL (ref 3.5–5)
ALP SERPL-CCNC: 137 U/L (ref 46–116)
ALT SERPL W P-5'-P-CCNC: 152 U/L (ref 12–78)
ANION GAP SERPL CALCULATED.3IONS-SCNC: 8 MMOL/L (ref 4–13)
AST SERPL W P-5'-P-CCNC: 89 U/L (ref 5–45)
BILIRUB SERPL-MCNC: 0.77 MG/DL (ref 0.2–1)
BUN SERPL-MCNC: 18 MG/DL (ref 5–25)
CALCIUM ALBUM COR SERPL-MCNC: 8.7 MG/DL (ref 8.3–10.1)
CALCIUM SERPL-MCNC: 7.7 MG/DL (ref 8.3–10.1)
CHLORIDE SERPL-SCNC: 104 MMOL/L (ref 100–108)
CO2 SERPL-SCNC: 22 MMOL/L (ref 21–32)
CREAT SERPL-MCNC: 0.87 MG/DL (ref 0.6–1.3)
GFR SERPL CREATININE-BSD FRML MDRD: 77 ML/MIN/1.73SQ M
GLUCOSE SERPL-MCNC: 116 MG/DL (ref 65–140)
POTASSIUM SERPL-SCNC: 4.1 MMOL/L (ref 3.5–5.3)
PROT SERPL-MCNC: 5.7 G/DL (ref 6.4–8.2)
SODIUM SERPL-SCNC: 134 MMOL/L (ref 136–145)

## 2022-05-23 PROCEDURE — 93010 ELECTROCARDIOGRAM REPORT: CPT | Performed by: INTERNAL MEDICINE

## 2022-05-23 PROCEDURE — 47562 LAPAROSCOPIC CHOLECYSTECTOMY: CPT | Performed by: SURGERY

## 2022-05-23 PROCEDURE — 88304 TISSUE EXAM BY PATHOLOGIST: CPT | Performed by: SPECIALIST

## 2022-05-23 PROCEDURE — 93005 ELECTROCARDIOGRAM TRACING: CPT

## 2022-05-23 PROCEDURE — NC001 PR NO CHARGE: Performed by: SURGERY

## 2022-05-23 PROCEDURE — 80053 COMPREHEN METABOLIC PANEL: CPT | Performed by: SURGERY

## 2022-05-23 RX ORDER — NEOSTIGMINE METHYLSULFATE 1 MG/ML
INJECTION INTRAVENOUS AS NEEDED
Status: DISCONTINUED | OUTPATIENT
Start: 2022-05-23 | End: 2022-05-23

## 2022-05-23 RX ORDER — KETOROLAC TROMETHAMINE 30 MG/ML
INJECTION, SOLUTION INTRAMUSCULAR; INTRAVENOUS AS NEEDED
Status: DISCONTINUED | OUTPATIENT
Start: 2022-05-23 | End: 2022-05-23

## 2022-05-23 RX ORDER — SODIUM CHLORIDE 9 MG/ML
125 INJECTION, SOLUTION INTRAVENOUS CONTINUOUS
Status: DISCONTINUED | OUTPATIENT
Start: 2022-05-23 | End: 2022-05-23

## 2022-05-23 RX ORDER — FENTANYL CITRATE 50 UG/ML
INJECTION, SOLUTION INTRAMUSCULAR; INTRAVENOUS AS NEEDED
Status: DISCONTINUED | OUTPATIENT
Start: 2022-05-23 | End: 2022-05-23

## 2022-05-23 RX ORDER — PROPOFOL 10 MG/ML
INJECTION, EMULSION INTRAVENOUS AS NEEDED
Status: DISCONTINUED | OUTPATIENT
Start: 2022-05-23 | End: 2022-05-23

## 2022-05-23 RX ORDER — LIDOCAINE HYDROCHLORIDE 20 MG/ML
INJECTION, SOLUTION EPIDURAL; INFILTRATION; INTRACAUDAL; PERINEURAL AS NEEDED
Status: DISCONTINUED | OUTPATIENT
Start: 2022-05-23 | End: 2022-05-23

## 2022-05-23 RX ORDER — FENTANYL CITRATE/PF 50 MCG/ML
50 SYRINGE (ML) INJECTION
Status: DISCONTINUED | OUTPATIENT
Start: 2022-05-23 | End: 2022-05-23 | Stop reason: HOSPADM

## 2022-05-23 RX ORDER — ONDANSETRON 2 MG/ML
4 INJECTION INTRAMUSCULAR; INTRAVENOUS ONCE AS NEEDED
Status: DISCONTINUED | OUTPATIENT
Start: 2022-05-23 | End: 2022-05-23 | Stop reason: HOSPADM

## 2022-05-23 RX ORDER — GLYCOPYRROLATE 0.2 MG/ML
INJECTION INTRAMUSCULAR; INTRAVENOUS AS NEEDED
Status: DISCONTINUED | OUTPATIENT
Start: 2022-05-23 | End: 2022-05-23

## 2022-05-23 RX ORDER — CEFAZOLIN SODIUM 2 G/50ML
2000 SOLUTION INTRAVENOUS ONCE
Status: COMPLETED | OUTPATIENT
Start: 2022-05-23 | End: 2022-05-23

## 2022-05-23 RX ORDER — SODIUM CHLORIDE, SODIUM LACTATE, POTASSIUM CHLORIDE, CALCIUM CHLORIDE 600; 310; 30; 20 MG/100ML; MG/100ML; MG/100ML; MG/100ML
125 INJECTION, SOLUTION INTRAVENOUS CONTINUOUS
Status: DISCONTINUED | OUTPATIENT
Start: 2022-05-23 | End: 2022-05-23

## 2022-05-23 RX ORDER — BUPIVACAINE HYDROCHLORIDE 2.5 MG/ML
INJECTION, SOLUTION EPIDURAL; INFILTRATION; INTRACAUDAL AS NEEDED
Status: DISCONTINUED | OUTPATIENT
Start: 2022-05-23 | End: 2022-05-23 | Stop reason: HOSPADM

## 2022-05-23 RX ORDER — ROCURONIUM BROMIDE 10 MG/ML
INJECTION, SOLUTION INTRAVENOUS AS NEEDED
Status: DISCONTINUED | OUTPATIENT
Start: 2022-05-23 | End: 2022-05-23

## 2022-05-23 RX ORDER — OXYCODONE HCL 5 MG/5 ML
5 SOLUTION, ORAL ORAL EVERY 4 HOURS PRN
Status: DISCONTINUED | OUTPATIENT
Start: 2022-05-23 | End: 2022-05-24 | Stop reason: HOSPADM

## 2022-05-23 RX ORDER — HYDROMORPHONE HCL IN WATER/PF 6 MG/30 ML
0.2 PATIENT CONTROLLED ANALGESIA SYRINGE INTRAVENOUS
Status: DISCONTINUED | OUTPATIENT
Start: 2022-05-23 | End: 2022-05-24 | Stop reason: HOSPADM

## 2022-05-23 RX ORDER — HYDRALAZINE HYDROCHLORIDE 20 MG/ML
INJECTION INTRAMUSCULAR; INTRAVENOUS AS NEEDED
Status: DISCONTINUED | OUTPATIENT
Start: 2022-05-23 | End: 2022-05-23

## 2022-05-23 RX ORDER — MAGNESIUM HYDROXIDE 1200 MG/15ML
LIQUID ORAL AS NEEDED
Status: DISCONTINUED | OUTPATIENT
Start: 2022-05-23 | End: 2022-05-23 | Stop reason: HOSPADM

## 2022-05-23 RX ORDER — ACETAMINOPHEN 325 MG/1
975 TABLET ORAL EVERY 8 HOURS SCHEDULED
Status: DISCONTINUED | OUTPATIENT
Start: 2022-05-23 | End: 2022-05-24 | Stop reason: HOSPADM

## 2022-05-23 RX ORDER — ONDANSETRON 2 MG/ML
INJECTION INTRAMUSCULAR; INTRAVENOUS AS NEEDED
Status: DISCONTINUED | OUTPATIENT
Start: 2022-05-23 | End: 2022-05-23

## 2022-05-23 RX ORDER — OXYCODONE HYDROCHLORIDE 5 MG/1
2.5 TABLET ORAL EVERY 4 HOURS PRN
Status: DISCONTINUED | OUTPATIENT
Start: 2022-05-23 | End: 2022-05-24 | Stop reason: HOSPADM

## 2022-05-23 RX ADMIN — GLYCOPYRROLATE 0.4 MG: 0.2 INJECTION, SOLUTION INTRAMUSCULAR; INTRAVENOUS at 13:25

## 2022-05-23 RX ADMIN — ONDANSETRON 4 MG: 2 INJECTION INTRAMUSCULAR; INTRAVENOUS at 13:20

## 2022-05-23 RX ADMIN — ACETAMINOPHEN 325MG 975 MG: 325 TABLET ORAL at 14:54

## 2022-05-23 RX ADMIN — ROCURONIUM BROMIDE 50 MG: 50 INJECTION, SOLUTION INTRAVENOUS at 11:09

## 2022-05-23 RX ADMIN — CEFAZOLIN SODIUM 2000 MG: 2 SOLUTION INTRAVENOUS at 11:11

## 2022-05-23 RX ADMIN — ROCURONIUM BROMIDE 20 MG: 50 INJECTION, SOLUTION INTRAVENOUS at 11:55

## 2022-05-23 RX ADMIN — SODIUM CHLORIDE 125 ML/HR: 0.9 INJECTION, SOLUTION INTRAVENOUS at 07:05

## 2022-05-23 RX ADMIN — ACETAMINOPHEN 325MG 975 MG: 325 TABLET ORAL at 23:47

## 2022-05-23 RX ADMIN — LIDOCAINE HYDROCHLORIDE 50 MG: 20 INJECTION, SOLUTION EPIDURAL; INFILTRATION; INTRACAUDAL; PERINEURAL at 11:09

## 2022-05-23 RX ADMIN — FENTANYL CITRATE 50 MCG: 50 INJECTION INTRAMUSCULAR; INTRAVENOUS at 13:15

## 2022-05-23 RX ADMIN — HYDRALAZINE HYDROCHLORIDE 5 MG: 20 INJECTION INTRAMUSCULAR; INTRAVENOUS at 11:55

## 2022-05-23 RX ADMIN — PROPOFOL 160 MG: 10 INJECTION, EMULSION INTRAVENOUS at 11:09

## 2022-05-23 RX ADMIN — KETOROLAC TROMETHAMINE 15 MG: 30 INJECTION, SOLUTION INTRAMUSCULAR at 13:25

## 2022-05-23 RX ADMIN — NEOSTIGMINE METHYLSULFATE 3 MG: 1 INJECTION INTRAVENOUS at 13:25

## 2022-05-23 RX ADMIN — FENTANYL CITRATE 50 MCG: 50 INJECTION, SOLUTION INTRAMUSCULAR; INTRAVENOUS at 13:53

## 2022-05-23 RX ADMIN — SODIUM CHLORIDE: 0.9 INJECTION, SOLUTION INTRAVENOUS at 12:45

## 2022-05-23 RX ADMIN — HYDRALAZINE HYDROCHLORIDE 5 MG: 20 INJECTION INTRAMUSCULAR; INTRAVENOUS at 11:45

## 2022-05-23 RX ADMIN — HYDRALAZINE HYDROCHLORIDE 5 MG: 20 INJECTION INTRAMUSCULAR; INTRAVENOUS at 12:03

## 2022-05-23 RX ADMIN — FENTANYL CITRATE 50 MCG: 50 INJECTION INTRAMUSCULAR; INTRAVENOUS at 11:09

## 2022-05-23 NOTE — PLAN OF CARE
Problem: PAIN - ADULT  Goal: Verbalizes/displays adequate comfort level or baseline comfort level  Description: Interventions:  - Encourage patient to monitor pain and request assistance  - Assess pain using appropriate pain scale  - Administer analgesics based on type and severity of pain and evaluate response  - Implement non-pharmacological measures as appropriate and evaluate response  - Consider cultural and social influences on pain and pain management  - Notify physician/advanced practitioner if interventions unsuccessful or patient reports new pain  Outcome: Progressing     Problem: INFECTION - ADULT  Goal: Absence or prevention of progression during hospitalization  Description: INTERVENTIONS:  - Assess and monitor for signs and symptoms of infection  - Monitor lab/diagnostic results  - Monitor all insertion sites, i e  indwelling lines, tubes, and drains  - Monitor endotracheal if appropriate and nasal secretions for changes in amount and color  - [place] appropriate cooling/warming therapies per order  - Administer medications as ordered  - Instruct and encourage patient and family to use good hand hygiene technique  - Identify and instruct in appropriate isolation precautions for identified infection/condition  Outcome: Progressing     Problem: SAFETY ADULT  Goal: Patient will remain free of falls  Description: INTERVENTIONS:  - Educate patient/family on patient safety including physical limitations  - Instruct patient to call for assistance with activity   - Consult OT/PT to assist with strengthening/mobility   - Keep Call bell within reach  - Keep bed low and locked with side rails adjusted as appropriate  - Keep care items and personal belongings within reach  - Initiate and maintain comfort rounds  - Make Fall Risk Sign visible to staff  - Offer Toileting every 2  Hours, in advance of need  - Initiate/Maintain bed alarm    - Apply yellow socks and bracelet for high fall risk patients  - Consider moving patient to room near nurses station  Outcome: Progressing     Problem: DISCHARGE PLANNING  Goal: Discharge to home or other facility with appropriate resources  Description: INTERVENTIONS:  - Identify barriers to discharge w/patient and caregiver  - Arrange for needed discharge resources and transportation as appropriate  - Identify discharge learning needs (meds, wound care, etc )  - Arrange for interpretive services to assist at discharge as needed  - Refer to Case Management Department for coordinating discharge planning if the patient needs post-hospital services based on physician/advanced practitioner order or complex needs related to functional status, cognitive ability, or social support system  Outcome: Progressing

## 2022-05-23 NOTE — QUICK NOTE
General Surgery Post-Op Note  Donna Cabrera 80 y o  male MRN: [id number]  Unit/Bed#: E5 -01 Encounter: [id number]  Procedure:  Subtotal fenestrated laparoscopic cholecystectomy     Subjective:  Patient is doing well postop  Pain is well controlled with tylenol  Denies N/V  His only complaint is hoarseness  Contacted by nursing regarding bradycardia into the 30s when sleeping  Patient reports he has seen cardiologist Dr Wes Andrew at the [address] [place] [address] Drive for this recently and was recommended monitoring with follow up in a few months  Currently he denies any shortness of breath, lightheadedness, dizziness or fatigue  Objective:  Vitals:  Vitals:    05/23/22 1434   BP: 130/54   Pulse: 61   Resp: 18   Temp: 97 7 °F (36 5 °C)   SpO2: 95%       I/O this shift:  In: 1800 [I V :1800]  Out: 0       Physical Exam:  General: AAOx3, in no acute distress  CV: Bradycardia irregularly irregular  Chest wall with well healed sternotomy scar  Grade 3/6 holosystolic murmur  Resp: good aeration b/l breath fields   Abd: soft, appropriate incisional tenderness   Incision sites c/d/i with glue, minimal serosanguinous drainage in SANDOVAL bulb RLQ      Vitals:  BP      Temp      Pulse     Resp      SpO2        I/Os:  I/O this shift:  In: 1800 [I V :1800]  Out: 0     Lab Results and Cultures:   CBC with diff:   Lab Results   Component Value Date    WBC 7 73 03/26/2022    HGB 12 1 03/26/2022    HCT 36 0 (L) 03/26/2022    MCV 94 03/26/2022     03/26/2022    MCH 31 4 03/26/2022    MCHC 33 6 03/26/2022    RDW 14 5 03/26/2022    MPV 10 6 03/26/2022    NRBC 0 03/26/2022   ,   BMP/CMP:  Lab Results   Component Value Date     03/24/2015    K 4 1 05/23/2022    K 4 4 03/24/2015     05/23/2022     03/24/2015    CO2 22 05/23/2022    CO2 26 03/24/2015    ANIONGAP 7 03/24/2015    BUN 18 05/23/2022    BUN 17 03/24/2015    CREATININE 0 87 05/23/2022    CREATININE 0 66 03/24/2015    GLUCOSE 100 03/24/2015    CALCIUM 7 7 (L) 05/23/2022    CALCIUM 8 9 03/24/2015    AST 89 (H) 05/23/2022     (H) 05/23/2022    ALKPHOS 137 (H) 05/23/2022    EGFR 77 05/23/2022   ,   Lipid Panel: No results found for: CHOL,   Coags:   Lab Results   Component Value Date    PTT 32 03/25/2022    INR 1 18 03/25/2022   ,     Current Medications:  Current Facility-Administered Medications   Medication Dose Route Frequency    acetaminophen (TYLENOL) tablet 975 mg  975 mg Oral Q8H [address]    HYDROmorphone HCl (DILAUDID) injection 0 2 mg  0 2 mg Intravenous Q1H PRN    lactated ringers bolus 1,000 mL  1,000 mL Intravenous Once PRN    And    lactated ringers bolus 1,000 mL  1,000 mL Intravenous Once PRN    oxyCODONE (ROXICODONE) IR tablet 2 5 mg  2 5 mg Oral Q4H PRN    oxyCODONE (ROXICODONE) oral solution 5 mg  5 mg Oral Q4H PRN    sodium chloride 0 9 % bolus 1,000 mL  1,000 mL Intravenous Once PRN    And    sodium chloride 0 9 % bolus 1,000 mL  1,000 mL Intravenous Once PRN       Assessment/Plan:   Ruth Sim is a 80 y o  male s/p subtotal fenestrated laparoscopic cholecystectomy  2/2 porcelain gallbladder    Pain: Pain well controlled on tylenol  Diet: surgical soft  Activity:  Ambulate patient four times daily     Plan for ECG to evaluate bradycardia and telemetry monitoring during hospitalization  Patient is asymptomatic from bradycardia at this time and reports no symptoms prior to admission     Recommend outpatient follow up with cardiologist     Rosalva Riddle PA-C  5/23/2022

## 2022-05-23 NOTE — H&P
History & Physical    Vaibhav Avila    80 y o   male  [id number]  Jina Cohen MD  Date: May 23, 2022    Assessment:  Patient Active Problem List   Diagnosis    Venous insufficiency    Hypertension    Hyperlipidemia    Erectile dysfunction of non-organic origin    S/P AVR (aortic valve replacement)    Presbyesophagus    Atrial flutter ([address] )    Current use of long term anticoagulation    History of sciatica    Choledocholithiasis    Porcelain gallbladder     Plan:  Vaibhav Avila is scheduled for robotic cholecystectomy  He had his ERCP and stent removal 6 days ago      HPI    Historical Information   Past Medical History:   Diagnosis Date    Acute bacterial prostatitis     last assessed: 5/29/15    Aortic valve stenosis     last assessed: 03/10/15    Coronary atherosclerosis     last assessed: 03/10/15    Hypertension     Left inguinal hernia     last assessed: 03/10/15    Mitral valve disorder     Status post laparoscopic hernia repair     last assessed: 04/14/15     Past Surgical History:   Procedure Laterality Date    AORTIC VALVE REPLACEMENT  2008    CATARACT EXTRACTION  2014    ERCP      INGUINAL HERNIA REPAIR Right     2012    INGUINAL HERNIA REPAIR Left     2017    TONSILLECTOMY       Social History   Social History     Substance and Sexual Activity   Alcohol Use Not Currently    Comment: rarely a beer     Social History     Substance and Sexual Activity   Drug Use Never     Social History     Tobacco Use   Smoking Status Former Smoker    Years: 20 00    Types: [de-identified], Pipe    Quit date: 2005    Years since quittin 4   Smokeless Tobacco Never Used     Family History   Problem Relation Age of Onset    Leukemia Mother     Coronary artery disease Father     Leukemia Father     Coronary artery disease Other     Leukemia Other         Meds/Allergies   No Known Allergies    Current Facility-Administered Medications:     ceFAZolin (ANCEF) IVPB (premix in dextrose) 2,000 mg 50 mL, 2,000 mg, Intravenous, Once, Manuela Ivey PA-C    lactated ringers infusion, 125 mL/hr, Intravenous, Continuous, Radha Unger PA-C    sodium chloride 0 9 % infusion, 125 mL/hr, Intravenous, Continuous, Moo Castillo, DO, Last Rate: 125 mL/hr at 05/23/22 0705, 125 mL/hr at 05/23/22 0705    Review of Systems    Vitals:    05/23/22 0606   BP: 157/70   Pulse: 68   Resp: 16   Temp: 99 3 °F (37 4 °C)   SpO2: 100%     Physical Exam  GEN: NAD, A+OX3   HEENT: Normocephalic, atraumatic,   NECK: Supple, trachea midline,   CARDIAC: regular rate & rhythm, S1 & S2 normal    LUNGS: Clear to auscultation, No Wheeze, Rales, or Rhonchi  ABDOMEN:  Soft nontender  EXTREMITIES: No evidence of cyanosis, clubbing or edema  Pulses +2 B/L LE  NEURO: CN II-XII intact grossly, No sensory or motor deficits    Lab Results: I have personally reviewed pertinent lab results      Imaging:   EKG, Pathology, and Other Studies:   Lab Results   Component Value Date    GLUCOSE 100 03/24/2015    CALCIUM 8 2 (L) 03/26/2022     03/24/2015    K 4 0 03/26/2022    CO2 24 03/26/2022     03/26/2022    BUN 12 03/26/2022    CREATININE 0 80 03/26/2022     Lab Results   Component Value Date    WBC 7 73 03/26/2022    HGB 12 1 03/26/2022    HCT 36 0 (L) 03/26/2022    MCV 94 03/26/2022     03/26/2022     Lab Results   Component Value Date     (H) 03/26/2022     (H) 03/26/2022    ALKPHOS 252 (H) 03/26/2022

## 2022-05-23 NOTE — ANESTHESIA POSTPROCEDURE EVALUATION
Post-Op Assessment Note    CV Status:  Stable    Pain management: adequate     Mental Status:  Alert and awake   Hydration Status:  Euvolemic   PONV Controlled:  Controlled   Airway Patency:  Patent      Post Op Vitals Reviewed: Yes      Staff: Anesthesiologist         No complications documented      BP      Temp      Pulse     Resp      SpO2      /54   Pulse 61   Temp 97 7 °F (36 5 °C)   Resp 18   Ht 6' 1" (1 854 m)   Wt 89 7 kg (197 lb 12 oz)   SpO2 95%   BMI 26 09 kg/m²

## 2022-05-23 NOTE — OP NOTE
OPERATIVE REPORT  PATIENT NAME: Marivel King    :  1933  MRN: [id number]  Pt Location: [facility] OR ROOM 03    SURGERY DATE: 2022    Surgeon(s) and Role:     * Fatmata Carbajal MD - Primary     * Pavan Daly MD - Assisting    Preop Diagnosis:  Porcelain gallbladder [K82 8]    Post-Op Diagnosis Codes:     * Porcelain gallbladder [K82 8]    Procedure(s) (LRB):  Subtotal Feneastrated CHOLECYSTECTOMY LAPAROSCOPIC (N/A)    Specimen(s):  ID Type Source Tests Collected by Time Destination   1 : GALLBLADDER Tissue Gallbladder TISSUE EXAM Fatmata Carbajal MD 2022 1309        Estimated Blood Loss:   Minimal    Drains:  * No LDAs found *    Anesthesia Type:   General    Operative Indications:  Porcelain gallbladder [K82 8]      Operative Findings:  Calcified replaced superior portion of the gallbladder  Very thick somewhat fixed liver  Dense thick fibrous adhesions inferior of the gallbladder    Complications:   None    Procedure and Technique:    The patient was seen again in the Holding Room  The risks, benefits, complications, treatment options, and expected outcomes were discussed with the patient  The possibilities of reaction to medication, pulmonary aspiration, perforation of viscus, bleeding, recurrent infection, finding a normal gallbladder, the need for additional procedures, failure to diagnose a condition, the possible need to convert to an open procedure, and creating a complication requiring transfusion or operation were discussed with the patient  The patient and/or family concurred with the proposed plan, giving informed consent  The site of surgery properly noted/marked  The patient was taken to Operating Room, identified as Marivel King  and the procedure verified as Laparoscopic Cholecystectomy with possible Intraoperative Cholangiogram  A Time Out was held after prepping and draping in sterile fashion  The above information was confirmed      Prior to the induction of general anesthesia, antibiotic prophylaxis was administered  General endotracheal anesthesia was then administered and tolerated well  After the induction, the abdomen was prepped in the usual sterile fashion  The patient was positioned in the supine position, along with some reverse Trendelenburg  Local anesthetic agent was injected into the skin near the umbilicus and an incision made  The midline fascia was incised and the open technique was used to introduce a  port under direct vision  Pneumoperitoneum was then created with CO2 and was tolerated well without any adverse changes in the patient's vital signs  Additional trocars were introduced under direct vision  All skin incisions were infiltrated with a local anesthetic agent before making the incision and placing the trocars  The patient was placed in reverse Trendelenburg position  The gallbladder was identified, the fundus grasped and retracted cephalad  Adhesions were lysed bluntly and with the electrocautery where indicated, taking care not to injure any adjacent organs or viscus  Gallbladder is grasped at the superior portion gallbladder is replaced by key calcifications and unable to be easily grasped  The gallbladder continued to tear due to their chronic inflammation and the calcifications  The liver was very thick and somewhat fixed and difficult to retract cephalad  Dissection was started down along the gallbladder opened the peritoneum with hot cautery  Extensive dissection was continued with cautery and [place] attempting to find the base the gallbladder  Cystic artery was freed up and identified circumferentially clipped and cut  The gallbladder however such dense adhesions inferiorly is unable to the OR unsafe to proceed any further  Therefore the gallbladder was opened and there were no stones left remaining    This point there was very minimal to no back flow and with the dense adhesions and uncertain of the anatomy was not felt safe to conclude the procedure here  The rest the gallbladder was removed back retrograde and taken the liver bed  This placed in Endo-Catch bag and secured  The liver bed was irrigated and inspected  Hemostasis was achieved with the electrocautery  Copious irrigation was utilized and was repeatedly aspirated until clear  A 15 Fito drain was passed through the lateral port and secured in the gallbladder fossa  The camera was then switched to the lateral port and directed back to the midline  The specimen was removed under direct visualization from the midline incision  The fascia was then closed using the renfac suture passer and a figure of eight 0 vicryl suture  Pneumoperitoneum was completely reduced after viewing removal of the trocars under direct vision  The wound was thoroughly irrigated  The skin was then closed with 4-0 monocryl and histacryl  Instrument, sponge, and needle counts were correct at closure and at the conclusion of the case  PA is medically necessary for the surgical safety of the case, including suturing, retracting, and hemostasis       I was present for the entire procedure    Patient Disposition:  PACU       SIGNATURE: Radha Diego MD  DATE: May 23, 2022  TIME: 1:18 PM

## 2022-05-24 ENCOUNTER — HOME HEALTH ADMISSION (OUTPATIENT)
Dept: HOME HEALTH SERVICES | Facility: HOME HEALTHCARE | Age: 87
End: 2022-05-24
Payer: COMMERCIAL

## 2022-05-24 VITALS
BODY MASS INDEX: 26.21 KG/M2 | RESPIRATION RATE: 18 BRPM | HEART RATE: 74 BPM | TEMPERATURE: 97.9 F | OXYGEN SATURATION: 92 % | WEIGHT: 197.75 LBS | HEIGHT: 73 IN | SYSTOLIC BLOOD PRESSURE: 143 MMHG | DIASTOLIC BLOOD PRESSURE: 74 MMHG

## 2022-05-24 LAB
BASOPHILS # BLD MANUAL: 0 THOUSAND/UL (ref 0–0.1)
BASOPHILS NFR MAR MANUAL: 0 % (ref 0–1)
EOSINOPHIL # BLD MANUAL: 0 THOUSAND/UL (ref 0–0.4)
EOSINOPHIL NFR BLD MANUAL: 0 % (ref 0–6)
ERYTHROCYTE [DISTWIDTH] IN BLOOD BY AUTOMATED COUNT: 14.3 % (ref 11.6–15.1)
HCT VFR BLD AUTO: 34.3 % (ref 36.5–49.3)
HGB BLD-MCNC: 11.2 G/DL (ref 12–17)
LYMPHOCYTES # BLD AUTO: 0.93 THOUSAND/UL (ref 0.6–4.47)
LYMPHOCYTES # BLD AUTO: 14 % (ref 14–44)
MCH RBC QN AUTO: 30 PG (ref 26.8–34.3)
MCHC RBC AUTO-ENTMCNC: 32.7 G/DL (ref 31.4–37.4)
MCV RBC AUTO: 92 FL (ref 82–98)
MONOCYTES # BLD AUTO: 0.33 THOUSAND/UL (ref 0–1.22)
MONOCYTES NFR BLD: 5 % (ref 4–12)
NEUTROPHILS # BLD MANUAL: 5.36 THOUSAND/UL (ref 1.85–7.62)
NEUTS BAND NFR BLD MANUAL: 4 % (ref 0–8)
NEUTS SEG NFR BLD AUTO: 77 % (ref 43–75)
PLATELET # BLD AUTO: 156 THOUSANDS/UL (ref 149–390)
PLATELET BLD QL SMEAR: ADEQUATE
PMV BLD AUTO: 10.9 FL (ref 8.9–12.7)
RBC # BLD AUTO: 3.73 MILLION/UL (ref 3.88–5.62)
RBC MORPH BLD: NORMAL
WBC # BLD AUTO: 6.62 THOUSAND/UL (ref 4.31–10.16)

## 2022-05-24 PROCEDURE — 85007 BL SMEAR W/DIFF WBC COUNT: CPT | Performed by: SURGERY

## 2022-05-24 PROCEDURE — ND001 PR NO DOCUMENTATION: Performed by: SURGERY

## 2022-05-24 PROCEDURE — 85027 COMPLETE CBC AUTOMATED: CPT | Performed by: SURGERY

## 2022-05-24 RX ADMIN — ACETAMINOPHEN 325MG 975 MG: 325 TABLET ORAL at 14:03

## 2022-05-24 RX ADMIN — ACETAMINOPHEN 325MG 975 MG: 325 TABLET ORAL at 06:04

## 2022-05-24 NOTE — CASE MANAGEMENT
Case Management Assessment & Discharge Planning Note    Patient name Mamta Kevin  Location [address] -* MRN [id number]  : 1933 Date 2022       Current Admission Date: 2022  Current Admission Diagnosis:Porcelain gallbladder   Patient Active Problem List    Diagnosis Date Noted    Porcelain gallbladder 2022    Choledocholithiasis 2022    Presbyesophagus 2021    Atrial flutter ([address] ) 2021    Current use of long term anticoagulation 2021    History of sciatica 2021    S/P AVR (aortic valve replacement) 2018    Hypertension 2017    Venous insufficiency 2014    Hyperlipidemia 2013    Erectile dysfunction of non-organic origin 2013      LOS (days): 0  Geometric Mean LOS (GMLOS) (days):   Days to GMLOS:     OBJECTIVE:              Current admission status: Outpatient Surgery  Referral Reason: VNA    Preferred Pharmacy:   [address] [facility], [facility] [address] [facility]  Phone: [phone] Fax: [phone]    Primary Care Provider: Rosalinda Solis MD    Primary Insurance: [facility]  Secondary Insurance:     ASSESSMENT:  Angela Chin Proxies    There are no active Health Care Proxies on file           Readmission Root Cause  30 Day Readmission: No    Patient Information  Admitted from[de-identified] Home  Mental Status: Alert  During Assessment patient was accompanied by: Not accompanied during assessment  Assessment information provided by[de-identified] Patient  Primary Caregiver: Self  Support Systems: Self, Spouse/significant other, [address] [facility] do you live in?: [address] [place] [address] Road: Lives w/ Spouse/significant other    Activities of Daily Living Prior to Admission  Functional Status: Independent  Completes ADLs independently?: Yes  Ambulates independently?: Yes  Does patient use assisted devices?: No  Does patient currently own DME?: No  Does patient have a history of HHC?: No  Does patient currently have [facility]?: No    Patient Information Continued  Income Source: Pension/FCI  Does patient have prescription coverage?: Yes    Means of Transportation  Means of Transport to Appts[de-identified] Drives Self        DISCHARGE DETAILS:    Discharge planning discussed with[de-identified] Patient     Freedom of Choice: Yes  Comments - Freedom of Choice: Pt states expectation to d/c to home when medically stable  CM consulted to start VNA services for drain care  Pt is in agreement  Pt requesting late day visits as he and his S O  sleep until 3:00pm  No other needs identified  Referral sent to [facility] for SN/drain care  Pt will transport home w/ S O 's son       Were Treatment Team discharge recommendations reviewed with patient/caregiver?: Yes  Did patient/caregiver verbalize understanding of patient care needs?: Yes  Were patient/caregiver advised of the risks associated with not following Treatment Team discharge recommendations?: Yes    [address]         Is the patient interested in [facility] at discharge?: Yes  Via [address] Page requested[de-identified] [address] Name[de-identified] [facility] Provider[de-identified] PCP  Home Health Services Needed[de-identified] Wound/Ostomy Care, Other (comment) ([facility])  Homebound Criteria Met[de-identified] Requires the Assistance of Another Person for Safe Ambulation or to Leave the Home  Supporting Clincal Findings[de-identified] Fatigues Easliy in [employer]    DME Referral Provided  Referral made for DME?: No  Other Referral/Resources/Interventions Provided:  Interventions: HHC    Treatment Team Recommendation: Home with [address]  Discharge Destination Plan[de-identified] Home with [place] at Discharge : Family, Automobile  Transfer Mode: Ambulate  Accompanied by: Family member

## 2022-05-24 NOTE — NURSING NOTE
Pt had minimal output when urinating  Urine appeared anthony yet clear  PVR'd for 871, pt on urinary retention protocol, straight cath'd for 1200 ml clear dark yellow urine  Tolerated well, will cont to monitor

## 2022-05-24 NOTE — PLAN OF CARE
Problem: PAIN - ADULT  Goal: Verbalizes/displays adequate comfort level or baseline comfort level  Description: Interventions:  - Encourage patient to monitor pain and request assistance  - Assess pain using appropriate pain scale  - Administer analgesics based on type and severity of pain and evaluate response  - Implement non-pharmacological measures as appropriate and evaluate response  - Consider cultural and social influences on pain and pain management  - Notify physician/advanced practitioner if interventions unsuccessful or patient reports new pain  Outcome: Progressing     Problem: INFECTION - ADULT  Goal: Absence or prevention of progression during hospitalization  Description: INTERVENTIONS:  - Assess and monitor for signs and symptoms of infection  - Monitor lab/diagnostic results  - Monitor all insertion sites, i e  indwelling lines, tubes, and drains  - Monitor endotracheal if appropriate and nasal secretions for changes in amount and color  - [place] appropriate cooling/warming therapies per order  - Administer medications as ordered  - Instruct and encourage patient and family to use good hand hygiene technique  - Identify and instruct in appropriate isolation precautions for identified infection/condition  Outcome: Progressing     Problem: SAFETY ADULT  Goal: Patient will remain free of falls  Description: INTERVENTIONS:  - Educate patient/family on patient safety including physical limitations  - Instruct patient to call for assistance with activity   - Consult OT/PT to assist with strengthening/mobility   - Keep Call bell within reach  - Keep bed low and locked with side rails adjusted as appropriate  - Keep care items and personal belongings within reach  - Initiate and maintain comfort rounds  - Make Fall Risk Sign visible to staff  - Offer Toileting every  Hours, in advance of need  - Initiate/Maintain alarm  - Obtain necessary fall risk management equipment:   - Apply yellow socks and bracelet for high fall risk patients  - Consider moving patient to room near nurses station  Outcome: Progressing     Problem: DISCHARGE PLANNING  Goal: Discharge to home or other facility with appropriate resources  Description: INTERVENTIONS:  - Identify barriers to discharge w/patient and caregiver  - Arrange for needed discharge resources and transportation as appropriate  - Identify discharge learning needs (meds, wound care, etc )  - Arrange for interpretive services to assist at discharge as needed  - Refer to Case Management Department for coordinating discharge planning if the patient needs post-hospital services based on physician/advanced practitioner order or complex needs related to functional status, cognitive ability, or social support system  Outcome: Progressing     Problem: Potential for Falls  Goal: Patient will remain free of falls  Description: INTERVENTIONS:  - Educate patient/family on patient safety including physical limitations  - Instruct patient to call for assistance with activity   - Consult OT/PT to assist with strengthening/mobility   - Keep Call bell within reach  - Keep bed low and locked with side rails adjusted as appropriate  - Keep care items and personal belongings within reach  - Initiate and maintain comfort rounds  - Make Fall Risk Sign visible to staff  - Offer Toileting every  Hours, in advance of need  - Initiate/Maintain alarm  - Obtain necessary fall risk management equipmen  - Apply yellow socks and bracelet for high fall risk patients  - Consider moving patient to room near nurses station  Outcome: Progressing     Problem: GENITOURINARY - ADULT  Goal: Absence of urinary retention  Description: INTERVENTIONS:  - Assess patients ability to void and empty bladder  - Monitor I/O  - Bladder scan as needed  - Discuss with physician/AP medications to alleviate retention as needed  - Discuss catheterization for long term situations as appropriate  Outcome: Progressing Problem: SKIN/TISSUE INTEGRITY - ADULT  Goal: Incision(s), wounds(s) or drain site(s) healing without S/S of infection  Description: INTERVENTIONS  - Assess and document dressing, incision, wound bed, drain sites and surrounding tissue  - Provide patient and family education  - Perform skin care/dressing changes every   Outcome: Progressing

## 2022-05-24 NOTE — PLAN OF CARE
Problem: PAIN - ADULT  Goal: Verbalizes/displays adequate comfort level or baseline comfort level  Description: Interventions:  - Encourage patient to monitor pain and request assistance  - Assess pain using appropriate pain scale  - Administer analgesics based on type and severity of pain and evaluate response  - Implement non-pharmacological measures as appropriate and evaluate response  - Consider cultural and social influences on pain and pain management  - Notify physician/advanced practitioner if interventions unsuccessful or patient reports new pain  Outcome: Progressing     Problem: INFECTION - ADULT  Goal: Absence or prevention of progression during hospitalization  Description: INTERVENTIONS:  - Assess and monitor for signs and symptoms of infection  - Monitor lab/diagnostic results  - Monitor all insertion sites, i e  indwelling lines, tubes, and drains  - Monitor endotracheal if appropriate and nasal secretions for changes in amount and color  - [place] appropriate cooling/warming therapies per order  - Administer medications as ordered  - Instruct and encourage patient and family to use good hand hygiene technique  - Identify and instruct in appropriate isolation precautions for identified infection/condition  Outcome: Progressing     Problem: SAFETY ADULT  Goal: Patient will remain free of falls  Description: INTERVENTIONS:  - Educate patient/family on patient safety including physical limitations  - Instruct patient to call for assistance with activity   - Consult OT/PT to assist with strengthening/mobility   - Keep Call bell within reach  - Keep bed low and locked with side rails adjusted as appropriate  - Keep care items and personal belongings within reach  - Initiate and maintain comfort rounds  - Make Fall Risk Sign visible to staff  - Offer Toileting every 2 Hours, in advance of need  - Initiate/Maintain bed alarm      - Apply yellow socks and bracelet for high fall risk patients  - Consider moving patient to room near nurses station  Outcome: Progressing     Problem: DISCHARGE PLANNING  Goal: Discharge to home or other facility with appropriate resources  Description: INTERVENTIONS:  - Identify barriers to discharge w/patient and caregiver  - Arrange for needed discharge resources and transportation as appropriate  - Identify discharge learning needs (meds, wound care, etc )  - Arrange for interpretive services to assist at discharge as needed  - Refer to Case Management Department for coordinating discharge planning if the patient needs post-hospital services based on physician/advanced practitioner order or complex needs related to functional status, cognitive ability, or social support system  Outcome: Progressing     Problem: Potential for Falls  Goal: Patient will remain free of falls  Description: INTERVENTIONS:  - Educate patient/family on patient safety including physical limitations  - Instruct patient to call for assistance with activity   - Consult OT/PT to assist with strengthening/mobility   - Keep Call bell within reach  - Keep bed low and locked with side rails adjusted as appropriate  - Keep care items and personal belongings within reach  - Initiate and maintain comfort rounds  - Make Fall Risk Sign visible to staff  - Offer Toileting every 2  Hours, in advance of need  - Initiate/Maintain bed alarm      - Apply yellow socks and bracelet for high fall risk patients  - Consider moving patient to room near nurses station  Outcome: Progressing

## 2022-05-24 NOTE — PROGRESS NOTES
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    Patient required straight cath x1 overnight    Vital signs stable, afebrile  Has had episodes of asymptomatic bradycardia post operatively  UOP 1475  Right SANDOVAL: 30 cc serosanguinous  AM CBC pending    Plan:   Continue diet as tolerated   Maintain SANDOVAL drain   Urinary retention protocol   Pain/ nausea control PRN   OOB/ ambulation   dispo planning   Pending CM for home VNA      Subjective/Objective     Subjective: Patient seen and examined at bedside, in no acute distress  No acute events overnight  Patient's pain is well controlled  He denies nausea or vomiting  Tolerating diet  Objective:     Vitals:Blood pressure 131/54, pulse (!) 38, temperature 98 °F (36 7 °C), resp  rate 18, height 6' 1" (1 854 m), weight 89 7 kg (197 lb 12 oz), SpO2 96 %  ,Body mass index is 26 09 kg/m²  Temp (24hrs), Av 3 °F (36 8 °C), Min:97 4 °F (36 3 °C), Max:99 5 °F (37 5 °C)  Current: Temperature: 98 °F (36 7 °C)      Intake/Output Summary (Last 24 hours) at 2022 0703  Last data filed at 2022 0101  Gross per 24 hour   Intake 1800 ml   Output 1505 ml   Net 295 ml       Invasive Devices  Report    Peripheral Intravenous Line  Duration           Peripheral IV 22 Dorsal (posterior); Left Hand <1 day          Drain  Duration           Closed/Suction Drain Right Abdomen Bulb 15 Fr  <1 day                Physical Exam:  General: No acute distress, alert and oriented  CV: Well perfused, regular rate and rhythm  Lungs: Normal work of breathing, no increased respiratory effort  Abdomen: Soft, appropriately tender, non-distended  Incisions clean, dry and intact   Right SANDOVAL drain in place with serosanguinous output  Extremities: No edema, clubbing or cyanosis  Skin: Warm, dry    Lab Results: Results: CBC pending  VTE Prophylaxis: Sequential compression device Imani Smalls)     Tia Steward MD  5/24/2022

## 2022-05-25 ENCOUNTER — TELEPHONE (OUTPATIENT)
Dept: SURGERY | Facility: CLINIC | Age: 87
End: 2022-05-25

## 2022-05-26 ENCOUNTER — HOME CARE VISIT (OUTPATIENT)
Dept: HOME HEALTH SERVICES | Facility: HOME HEALTHCARE | Age: 87
End: 2022-05-26
Payer: COMMERCIAL

## 2022-05-26 VITALS
RESPIRATION RATE: 15 BRPM | OXYGEN SATURATION: 98 % | HEART RATE: 70 BPM | DIASTOLIC BLOOD PRESSURE: 65 MMHG | SYSTOLIC BLOOD PRESSURE: 140 MMHG

## 2022-05-26 PROCEDURE — G0299 HHS/HOSPICE OF RN EA 15 MIN: HCPCS

## 2022-05-26 PROCEDURE — 400013 VN SOC

## 2022-05-31 ENCOUNTER — OFFICE VISIT (OUTPATIENT)
Dept: SURGERY | Facility: CLINIC | Age: 87
End: 2022-05-31

## 2022-05-31 VITALS
HEIGHT: 73 IN | SYSTOLIC BLOOD PRESSURE: 156 MMHG | BODY MASS INDEX: 25.98 KG/M2 | WEIGHT: 196 LBS | DIASTOLIC BLOOD PRESSURE: 74 MMHG

## 2022-05-31 DIAGNOSIS — Z90.49 STATUS POST CHOLECYSTECTOMY: Primary | ICD-10-CM

## 2022-05-31 PROCEDURE — G0180 MD CERTIFICATION HHA PATIENT: HCPCS | Performed by: SURGERY

## 2022-05-31 PROCEDURE — 99024 POSTOP FOLLOW-UP VISIT: CPT | Performed by: PHYSICIAN ASSISTANT

## 2022-05-31 NOTE — PROGRESS NOTES
Assessment/Plan:   Karla Potts is a 80 y o male who comes in today for postoperative check after subtotal cholecystectomy for porcelain gallbladder with MANPREET drain placements with Dr Reyes Covert on 5/23/22  ERCP 5/17:  ERCP with removal of common bile duct stent, extension of previous sphincterotomy, extraction of large common bile duct stone and debris    Patient is pleased with the outcome of surgery and is doing well  Pathology: Pathology is pending, we will call patient when results return  4 x 4 and tape covered MANPREET drain hole       ______________________________________________________  HPI:  Karla Potts is a 80 y o male who comes in today for postoperative check after recent subtotal cholecystectomy for porcelain gallbladder with MANPREET drain placements with Dr Reyes Covert on 5/23/22  Currently doing well without problems, no fever or chills,no nausea and no vomiting  Patient reports they have resumed their normal diet  denies biliary diarrhea  Reports doing well  Reports his manpreet drain tubing got kinked from the suture line being pulled and no more draining  No distention of abdomen  Urinating on own  Patient tied off and cut his MANPREET bulb a few days ago as nothing was draining  ROS:  General ROS: negative for - chills, fatigue, fever or night sweats, weight loss  Respiratory ROS: no cough, shortness of breath, or wheezing  Cardiovascular ROS: no chest pain or dyspnea on exertion  Genito-Urinary ROS: no dysuria, trouble voiding, or hematuria  Musculoskeletal ROS: negative for - gait disturbance, joint pain or muscle pain  Neurological ROS: no TIA or stroke symptoms  GI ROS: see HPI  Skin ROS: no new rashes or lesions   Lymphatic ROS: no new adenopathy noted by pt     Psy ROS: no new mental or behavioral disturbances       Patient Active Problem List   Diagnosis    Venous insufficiency    Hypertension    Hyperlipidemia    Erectile dysfunction of non-organic origin    S/P AVR (aortic valve replacement)    Presbyesophagus    Atrial flutter (HCC)    Current use of long term anticoagulation    History of sciatica    Choledocholithiasis    Porcelain gallbladder           Allergies:  Patient has no known allergies        Current Outpatient Medications:     Ascorbic Acid (VITAMIN C PO), Take by mouth in the morning, Disp: , Rfl:     Cholecalciferol (VITAMIN D3 PO), Take by mouth in the morning, Disp: , Rfl:     Coenzyme Q10 (CO Q-10 PO), Take by mouth in the morning, Disp: , Rfl:     furosemide (LASIX) 20 mg tablet, Take 20 mg by mouth as needed in the morning , Disp: , Rfl:     GRAPE SEED EXTRACT PO, Take by mouth in the morning, Disp: , Rfl:     lisinopril (ZESTRIL) 10 mg tablet, Take 1 tablet by mouth daily, Disp: , Rfl:     MAGNESIUM PO, Take by mouth daily, Disp: , Rfl:     Misc Natural Products (DAILY HERBS LEG VEIN/CIRCULATI PO), Take by mouth in the morning VEIN SUPREME, Disp: , Rfl:     Misc Natural Products (OSTEO BI-FLEX JOINT SHIELD PO), Take by mouth in the morning, Disp: , Rfl:     Multiple Vitamins-Minerals (EYE VITAMINS PO), Take by mouth in the morning MACUGUARD, Disp: , Rfl:     Multiple Vitamins-Minerals (ZINC PO), Take by mouth in the morning, Disp: , Rfl:     NATTOKINASE PO, Take by mouth in the morning, Disp: , Rfl:     Nutritional Supplements (DHEA PO), Take by mouth in the morning, Disp: , Rfl:     Probiotic Product (PROBIOTIC PO), Take by mouth in the morning, Disp: , Rfl:     QUERCETIN PO, Take by mouth in the morning, Disp: , Rfl:     Red Yeast Rice Extract (RED YEAST RICE PO), Take by mouth 2 (two) times a day, Disp: , Rfl:     RESVERATROL PO, Take by mouth in the morning, Disp: , Rfl:     RUTIN PO, Take by mouth in the morning, Disp: , Rfl:     SELENIUM PO, Take by mouth in the morning, Disp: , Rfl:     Thiamine HCl (VITAMIN B-1 PO), Take by mouth daily, Disp: , Rfl:     TURMERIC CURCUMIN PO, Take by mouth in the morning, Disp: , Rfl:     Xarelto 20 MG tablet, Take 20 mg by mouth daily after dinner, Disp: , Rfl:     Past Medical History:   Diagnosis Date    A-fib ([facility] [address] )     Acute bacterial prostatitis     last assessed: 5/29/15    Aortic valve stenosis     last assessed: 03/10/15    Coronary atherosclerosis     last assessed: 03/10/15    Hypertension     Left inguinal hernia     last assessed: 03/10/15    Mitral valve disorder     Status post laparoscopic hernia repair     last assessed: 04/14/15       Past Surgical History:   Procedure Laterality Date    AORTIC VALVE REPLACEMENT  02/2008    CATARACT EXTRACTION  09/08/2014    ERCP      INGUINAL HERNIA REPAIR Right     2012    INGUINAL HERNIA REPAIR Left     2017    WA LAP,CHOLECYSTECTOMY N/A 5/23/2022    Procedure: Subtotal Feneastrated CHOLECYSTECTOMY LAPAROSCOPIC;  Surgeon: Lori Ballard MD;  Location: [facility];  Service: General    TONSILLECTOMY         Family History   Problem Relation Age of Onset    Leukemia Mother     Coronary artery disease Father     Leukemia Father     Coronary artery disease Other     Leukemia Other         reports that he quit smoking about 17 years ago  His smoking use included cigars and pipe  He quit after 20 00 years of use  He has never used smokeless tobacco  He reports previous alcohol use  He reports that he does not use drugs  Vitals:    05/31/22 1351   BP: 156/74       PHYSICAL EXAM  General: normal, cooperative, no distress  Abdominal: soft, nondistended or nontender  Incision: clean, dry, and intact and healing well; SANDOVAL drain removed and covered with 4 x 4 and tape          Amelia Rose PA-C      Date: 5/31/2022 Time: 2:10 PM

## 2022-06-02 ENCOUNTER — HOME CARE VISIT (OUTPATIENT)
Dept: HOME HEALTH SERVICES | Facility: HOME HEALTHCARE | Age: 87
End: 2022-06-02
Payer: COMMERCIAL

## 2022-06-02 VITALS
OXYGEN SATURATION: 98 % | RESPIRATION RATE: 15 BRPM | HEART RATE: 70 BPM | SYSTOLIC BLOOD PRESSURE: 135 MMHG | DIASTOLIC BLOOD PRESSURE: 75 MMHG | TEMPERATURE: 96.8 F

## 2022-06-02 PROCEDURE — G0299 HHS/HOSPICE OF RN EA 15 MIN: HCPCS

## 2022-06-03 LAB
ATRIAL RATE: 260 BPM
P AXIS: 256 DEGREES
QRS AXIS: -59 DEGREES
QRSD INTERVAL: 166 MS
QT INTERVAL: 486 MS
QTC INTERVAL: 505 MS
T WAVE AXIS: 40 DEGREES
VENTRICULAR RATE: 65 BPM

## 2022-07-27 ENCOUNTER — OFFICE VISIT (OUTPATIENT)
Dept: INTERNAL MEDICINE CLINIC | Facility: CLINIC | Age: 87
End: 2022-07-27
Payer: COMMERCIAL

## 2022-07-27 VITALS
TEMPERATURE: 98 F | HEIGHT: 73 IN | SYSTOLIC BLOOD PRESSURE: 150 MMHG | DIASTOLIC BLOOD PRESSURE: 80 MMHG | RESPIRATION RATE: 18 BRPM | WEIGHT: 199 LBS | OXYGEN SATURATION: 99 % | BODY MASS INDEX: 26.37 KG/M2 | HEART RATE: 64 BPM

## 2022-07-27 DIAGNOSIS — K22.89 PRESBYESOPHAGUS: ICD-10-CM

## 2022-07-27 DIAGNOSIS — I87.2 VENOUS INSUFFICIENCY: ICD-10-CM

## 2022-07-27 DIAGNOSIS — Z95.2 S/P AVR (AORTIC VALVE REPLACEMENT): Primary | ICD-10-CM

## 2022-07-27 DIAGNOSIS — I10 PRIMARY HYPERTENSION: ICD-10-CM

## 2022-07-27 DIAGNOSIS — I48.3 TYPICAL ATRIAL FLUTTER (HCC): ICD-10-CM

## 2022-07-27 PROBLEM — K82.8 PORCELAIN GALLBLADDER: Status: RESOLVED | Noted: 2022-03-26 | Resolved: 2022-07-27

## 2022-07-27 PROBLEM — K80.50 CHOLEDOCHOLITHIASIS: Status: RESOLVED | Noted: 2022-03-25 | Resolved: 2022-07-27

## 2022-07-27 PROCEDURE — 99214 OFFICE O/P EST MOD 30 MIN: CPT | Performed by: INTERNAL MEDICINE

## 2022-07-27 NOTE — PROGRESS NOTES
INTERNAL MEDICINE OFFICE VISIT       NAME: Errol Canales  AGE: 80 y o  SEX: male       : 1933        MRN: [id number]    DATE: 2022  TIME: 4:36 PM    Assessment and Plan   1  S/P AVR (aortic valve replacement)    2  Typical atrial flutter ([address] )    3  Primary hypertension    4  Venous insufficiency    5  Presbyesophagus         Follow-up will be in 6 months  Discussed monitoring for national now it has been about the latest COVID booster and to proceed along with flu shot  Continue active follow-up with Cardiology  Chief Complaint     Chief Complaint   Patient presents with    Follow-up       History of Present Illness   Errol Canales is a 80y o -year-old male who is here for follow-up visit  At his advanced age of 80, he is extremely functional including acting as a caregiver for his girlfriend who lives with him and has significant dementia  He has a great attitude and good social supports  He has recovered from his gallbladder surgery, and has been released from active follow-up by his general surgeon since his May 23rd surgery  He had his Medicare annual visit in April of this year  He notes occasional irregularity of his pulse, and follows orthostatic precautions to avoid occasional lightheadedness  He has not fallen  He has no clinical bleeding on anticoagulation  He is up-to-date with follow-up with Cardiology  Appetite is good and he is moving his bowels regularly  He still has problems associated with presbyesophagus with some sticking of food with repositioning while eating solving this problem and avoiding acidic foods  He does not regurgitate food  We reviewed his computer printout of his home blood pressures which are well controlled  Pulse continues to run low but is asymptomatic  Severe lower leg venous insufficiency related edema continues respond well to diligent use of knee-high support stockings  He has had no phlebitic symptoms  He reports no new problems  Review of Systems   Review of Systems   Cardiovascular: Positive for leg swelling  Negative for chest pain and palpitations  Musculoskeletal: Negative for gait problem  Neurological: Negative for syncope and light-headedness  Psychiatric/Behavioral: Negative  Active Problem List     Patient Active Problem List   Diagnosis    Venous insufficiency    Hypertension    Hyperlipidemia    Erectile dysfunction of non-organic origin    S/P AVR (aortic valve replacement)    Presbyesophagus    Atrial flutter (HCC)    Current use of long term anticoagulation       The following portions of the patient's history were reviewed and updated as appropriate: allergies, current medications, past family history, past medical history, past social history, past surgical history, and problem list     Objective     Vitals:    07/27/22 1559   BP: 150/80   Pulse: 64   Resp: 18   Temp: 98 °F (36 7 °C)   SpO2: 99%     Wt Readings from Last 3 Encounters:   07/27/22 90 3 kg (199 lb)   05/31/22 88 9 kg (196 lb)   05/23/22 89 7 kg (197 lb 12 oz)       Physical Exam     Vital signs stable, decreased pulse rate with occasional irregularity, excellent vision and slightly impaired hearing, appears younger than stated age, with superior strength and balance  Mood is optimal   Cognitive status is ideal   Skin warm and dry without pallor or icterus  Normocephalic atraumatic  Neck supple without trauma and there is no JVD  There are no carotid bruits  Carotid upstroke in the center normal   Lungs are clear  Cardiac:  Decreased rate, regular rhythm, no change in systolic murmur, no diastolic murmur and no S3 or rub  Abdomen:  Well-healed laparoscopic scars, no hernia, normal bowel sounds, nontender and soft without mass or bruit organomegaly  There is pitting lower leg edema with knee-high stockings in place right greater than left which is unchanged  Skin integrity is well preserved    There are no phlebitic findings  Peripheral circulation is intact  Joint function is well preserved for advanced age  ALLERGIES:  No Known Allergies    Current Medications     Current Outpatient Medications   Medication Sig Dispense Refill    Ascorbic Acid (VITAMIN C PO) Take by mouth in the morning      Cholecalciferol (VITAMIN D3 PO) Take by mouth in the morning      Coenzyme Q10 (CO Q-10 PO) Take by mouth in the morning      furosemide (LASIX) 20 mg tablet Take 20 mg by mouth as needed in the morning   GRAPE SEED EXTRACT PO Take by mouth in the morning      lisinopril (ZESTRIL) 10 mg tablet Take 1 tablet by mouth daily      MAGNESIUM PO Take by mouth daily      Misc Natural Products (DAILY HERBS LEG VEIN/CIRCULATI PO) Take by mouth in the morning VEIN SUPREME      Misc Natural Products (OSTEO BI-FLEX JOINT SHIELD PO) Take by mouth in the morning      Multiple Vitamins-Minerals (EYE VITAMINS PO) Take by mouth in the morning MACUGUARD      Multiple Vitamins-Minerals (ZINC PO) Take by mouth in the morning      NATTOKINASE PO Take by mouth in the morning      Nutritional Supplements (DHEA PO) Take by mouth in the morning      Probiotic Product (PROBIOTIC PO) Take by mouth in the morning      QUERCETIN PO Take by mouth in the morning      Red Yeast Rice Extract (RED YEAST RICE PO) Take by mouth 2 (two) times a day      RESVERATROL PO Take by mouth in the morning      RUTIN PO Take by mouth in the morning      SELENIUM PO Take by mouth in the morning      Thiamine HCl (VITAMIN B-1 PO) Take by mouth daily      TURMERIC CURCUMIN PO Take by mouth in the morning      Xarelto 20 MG tablet Take 20 mg by mouth daily after dinner       No current facility-administered medications for this visit           Blake Wagner MD

## 2022-12-13 ENCOUNTER — APPOINTMENT (OUTPATIENT)
Dept: LAB | Facility: MEDICAL CENTER | Age: 87
End: 2022-12-13

## 2022-12-13 LAB
ALBUMIN SERPL BCP-MCNC: 3.8 G/DL (ref 3.5–5)
ALP SERPL-CCNC: 76 U/L (ref 46–116)
ALT SERPL W P-5'-P-CCNC: 36 U/L (ref 12–78)
ANION GAP SERPL CALCULATED.3IONS-SCNC: 6 MMOL/L (ref 4–13)
AST SERPL W P-5'-P-CCNC: 24 U/L (ref 5–45)
BASOPHILS # BLD AUTO: 0.05 THOUSANDS/ÂΜL (ref 0–0.1)
BASOPHILS NFR BLD AUTO: 1 % (ref 0–1)
BILIRUB SERPL-MCNC: 0.61 MG/DL (ref 0.2–1)
BUN SERPL-MCNC: 24 MG/DL (ref 5–25)
CALCIUM SERPL-MCNC: 9.4 MG/DL (ref 8.3–10.1)
CHLORIDE SERPL-SCNC: 108 MMOL/L (ref 96–108)
CO2 SERPL-SCNC: 26 MMOL/L (ref 21–32)
CREAT SERPL-MCNC: 0.85 MG/DL (ref 0.6–1.3)
EOSINOPHIL # BLD AUTO: 0.24 THOUSAND/ÂΜL (ref 0–0.61)
EOSINOPHIL NFR BLD AUTO: 4 % (ref 0–6)
ERYTHROCYTE [DISTWIDTH] IN BLOOD BY AUTOMATED COUNT: 14.5 % (ref 11.6–15.1)
GFR SERPL CREATININE-BSD FRML MDRD: 77 ML/MIN/1.73SQ M
GLUCOSE P FAST SERPL-MCNC: 91 MG/DL (ref 65–99)
HCT VFR BLD AUTO: 40.7 % (ref 36.5–49.3)
HGB BLD-MCNC: 13.1 G/DL (ref 12–17)
IMM GRANULOCYTES # BLD AUTO: 0.02 THOUSAND/UL (ref 0–0.2)
IMM GRANULOCYTES NFR BLD AUTO: 0 % (ref 0–2)
LYMPHOCYTES # BLD AUTO: 1.59 THOUSANDS/ÂΜL (ref 0.6–4.47)
LYMPHOCYTES NFR BLD AUTO: 25 % (ref 14–44)
MCH RBC QN AUTO: 30.6 PG (ref 26.8–34.3)
MCHC RBC AUTO-ENTMCNC: 32.2 G/DL (ref 31.4–37.4)
MCV RBC AUTO: 95 FL (ref 82–98)
MONOCYTES # BLD AUTO: 0.68 THOUSAND/ÂΜL (ref 0.17–1.22)
MONOCYTES NFR BLD AUTO: 11 % (ref 4–12)
NEUTROPHILS # BLD AUTO: 3.68 THOUSANDS/ÂΜL (ref 1.85–7.62)
NEUTS SEG NFR BLD AUTO: 59 % (ref 43–75)
NRBC BLD AUTO-RTO: 0 /100 WBCS
PLATELET # BLD AUTO: 247 THOUSANDS/UL (ref 149–390)
PMV BLD AUTO: 10.2 FL (ref 8.9–12.7)
POTASSIUM SERPL-SCNC: 4.4 MMOL/L (ref 3.5–5.3)
PROT SERPL-MCNC: 7.3 G/DL (ref 6.4–8.4)
RBC # BLD AUTO: 4.28 MILLION/UL (ref 3.88–5.62)
SODIUM SERPL-SCNC: 140 MMOL/L (ref 135–147)
WBC # BLD AUTO: 6.26 THOUSAND/UL (ref 4.31–10.16)

## 2022-12-28 ENCOUNTER — OFFICE VISIT (OUTPATIENT)
Dept: INTERNAL MEDICINE CLINIC | Facility: CLINIC | Age: 87
End: 2022-12-28

## 2022-12-28 VITALS
OXYGEN SATURATION: 100 % | DIASTOLIC BLOOD PRESSURE: 60 MMHG | SYSTOLIC BLOOD PRESSURE: 126 MMHG | BODY MASS INDEX: 27.2 KG/M2 | WEIGHT: 205.2 LBS | RESPIRATION RATE: 16 BRPM | TEMPERATURE: 97.6 F | HEIGHT: 73 IN | HEART RATE: 45 BPM

## 2022-12-28 DIAGNOSIS — I10 PRIMARY HYPERTENSION: Primary | ICD-10-CM

## 2022-12-28 DIAGNOSIS — I48.3 TYPICAL ATRIAL FLUTTER (HCC): ICD-10-CM

## 2022-12-28 DIAGNOSIS — Z79.01 CURRENT USE OF LONG TERM ANTICOAGULATION: ICD-10-CM

## 2022-12-28 DIAGNOSIS — Z95.2 S/P AVR (AORTIC VALVE REPLACEMENT): ICD-10-CM

## 2022-12-28 DIAGNOSIS — K22.89 PRESBYESOPHAGUS: ICD-10-CM

## 2022-12-28 NOTE — PROGRESS NOTES
INTERNAL MEDICINE OFFICE VISIT       NAME: Lesley Hirsch  AGE: 80 y o  SEX: male       : 1933        MRN: [id number]    DATE: 2022  TIME: 3:42 PM    Assessment and Plan   1  Primary hypertension    2  Typical atrial flutter ([address] )    3  S/P AVR (aortic valve replacement)    4  Current use of long term anticoagulation    5  Presbyesophagus         Next visit will be in May  Chief Complaint   Follow-up  History of Present Illness   Lesley Hirsch is a 80y o -year-old male who returns for 6-month follow-up visit  Nona Zambrano has superior health and functional capacity  He continues to provide full-time care for his longstanding friend, Bryn Gordon  They have occasional family visitors but otherwise Nona Zambrano maintains the household by himself which is his lifelong routine  He stays very busy around the house, managing finances, caring for his friend, reading, word puzzles and socialization is optimal     Strength and balance are superior and there is no cognitive deficit  Hypertension is well controlled without orthostatic lightheadedness reported today on lisinopril 10 mg daily  There is a history of atrial flutter without recent symptoms since aortic valve replacement  This includes no palpitations, lightheadedness or syncope  There is no dyspnea or chest pain  Active follow-up continues in cardiology every 6 months status post aortic valve replacement with congenital murmur unchanged on exam today  There is no clinical bleeding on Xarelto  CBC and CMP of  were normal and were reviewed today  There is better full recovery from cholecystectomy earlier this year with associated choledocholithiasis  This is separate from his symptoms of mild reflux controlled with a combination of natural supplement and calcium carbonate  Immunizations are up-to-date with mild flulike symptoms after flu vaccination  There are no new problems      Paulino's goals are to stay in his independent environment  For his friend and he is certainly very capable of accomplishing this at this time  Review of Systems   Review of Systems as above  Appetite is good and weight is stable  Bowel movements are regular  No change in mild urinary incontinence  Active Problem List     Patient Active Problem List   Diagnosis   • Venous insufficiency   • Hypertension   • Hyperlipidemia   • Erectile dysfunction of non-organic origin   • S/P AVR (aortic valve replacement)   • Presbyesophagus   • Atrial flutter (HCC)   • Current use of long term anticoagulation       The following portions of the patient's history were reviewed and updated as appropriate: allergies, current medications, past family history, past medical history, past social history, past surgical history, and problem list     Objective     Vitals:    12/28/22 1511   BP: 126/60   Pulse: (!) 45   Resp: 16   Temp: 97 6 °F (36 4 °C)   SpO2: 100%     Wt Readings from Last 3 Encounters:   12/28/22 93 1 kg (205 lb 3 2 oz)   07/27/22 90 3 kg (199 lb)   05/31/22 88 9 kg (196 lb)       Physical Exam     Alert, oriented, vital signs stable, appearing much younger than his stated age  Strength and balance are superior as is overall strength  Hearing and vision are well-preserved  Normocephalic/atraumatic  Skin warm and dry without pallor or icterus  Neck supple without JVD  Transmitted cardiac murmur is heard in both carotids equally  Upstroke and descent are normal   No head or neck mass or adenopathy  Neck supple  No supraclavicular adenopathy  Lungs clear throughout  Cardiac: Regular rate and rhythm, loud systolic murmur heard across the precordium is unchanged with decrescendo diastolic murmur also easily audible throughout the precordium  There is no rub  Abdomen: Nondistended with normal bowel sounds, soft and nontender without masses bruits or organomegaly  Peripheral circulation is intact    There is trace edema of lower legs with mild varicosities left phlebitic findings  Gait is stable and normal         Pertinent Laboratory/Diagnostic Studies:            ALLERGIES:  No Known Allergies    Current Medications     Current Outpatient Medications   Medication Sig Dispense Refill   • Ascorbic Acid (VITAMIN C PO) Take by mouth in the morning     • Cholecalciferol (VITAMIN D3 PO) Take by mouth in the morning     • Coenzyme Q10 (CO Q-10 PO) Take by mouth in the morning     • furosemide (LASIX) 20 mg tablet Take 20 mg by mouth as needed in the morning  • GRAPE SEED EXTRACT PO Take by mouth in the morning     • lisinopril (ZESTRIL) 10 mg tablet Take 1 tablet by mouth daily     • MAGNESIUM PO Take by mouth daily     • Misc Natural Products (DAILY HERBS LEG VEIN/CIRCULATI PO) Take by mouth in the morning VEIN SUPREME     • Misc Natural Products (OSTEO BI-FLEX JOINT SHIELD PO) Take by mouth in the morning     • Multiple Vitamins-Minerals (EYE VITAMINS PO) Take by mouth in the morning MACUGUARD     • Multiple Vitamins-Minerals (ZINC PO) Take by mouth in the morning     • NATTOKINASE PO Take by mouth in the morning     • Nutritional Supplements (DHEA PO) Take by mouth in the morning     • Probiotic Product (PROBIOTIC PO) Take by mouth in the morning     • QUERCETIN PO Take by mouth in the morning     • Red Yeast Rice Extract (RED YEAST RICE PO) Take by mouth 2 (two) times a day     • RESVERATROL PO Take by mouth in the morning     • RUTIN PO Take by mouth in the morning     • SELENIUM PO Take by mouth in the morning     • Thiamine HCl (VITAMIN B-1 PO) Take by mouth daily     • TURMERIC CURCUMIN PO Take by mouth in the morning     • Xarelto 20 MG tablet Take 20 mg by mouth daily after dinner       No current facility-administered medications for this visit           Lul Westbrook MD

## 2023-05-24 ENCOUNTER — OFFICE VISIT (OUTPATIENT)
Dept: INTERNAL MEDICINE CLINIC | Facility: CLINIC | Age: 88
End: 2023-05-24

## 2023-05-24 VITALS
SYSTOLIC BLOOD PRESSURE: 142 MMHG | HEART RATE: 65 BPM | WEIGHT: 216.4 LBS | BODY MASS INDEX: 28.68 KG/M2 | HEIGHT: 73 IN | TEMPERATURE: 98 F | OXYGEN SATURATION: 97 % | DIASTOLIC BLOOD PRESSURE: 84 MMHG

## 2023-05-24 DIAGNOSIS — Z79.01 CURRENT USE OF LONG TERM ANTICOAGULATION: ICD-10-CM

## 2023-05-24 DIAGNOSIS — Z00.00 MEDICARE ANNUAL WELLNESS VISIT, SUBSEQUENT: Primary | ICD-10-CM

## 2023-05-24 DIAGNOSIS — I10 PRIMARY HYPERTENSION: ICD-10-CM

## 2023-05-24 DIAGNOSIS — E78.01 FAMILIAL HYPERCHOLESTEROLEMIA: ICD-10-CM

## 2023-05-24 DIAGNOSIS — I48.3 TYPICAL ATRIAL FLUTTER (HCC): ICD-10-CM

## 2023-05-24 DIAGNOSIS — Z95.2 S/P AVR (AORTIC VALVE REPLACEMENT): ICD-10-CM

## 2023-05-24 DIAGNOSIS — R60.9 EDEMA, UNSPECIFIED TYPE: ICD-10-CM

## 2023-05-24 RX ORDER — SPIRONOLACTONE 25 MG/1
TABLET ORAL
COMMUNITY
Start: 2023-04-25 | End: 2023-05-24

## 2023-05-24 RX ORDER — BUMETANIDE 1 MG/1
1 TABLET ORAL DAILY
COMMUNITY
Start: 2023-05-10 | End: 2023-05-24

## 2023-05-24 RX ORDER — SPIRONOLACTONE 25 MG/1
TABLET ORAL
Qty: 30 TABLET | Refills: 1 | Status: SHIPPED | OUTPATIENT
Start: 2023-05-24

## 2023-05-24 RX ORDER — FUROSEMIDE 20 MG/1
20 TABLET ORAL DAILY
Qty: 30 TABLET | Refills: 1 | Status: SHIPPED | OUTPATIENT
Start: 2023-05-24

## 2023-05-24 NOTE — PROGRESS NOTES
Assessment and Plan:     Problem List Items Addressed This Visit        Cardiovascular and Mediastinum    Hypertension    Atrial flutter (HCC)       Other    Hyperlipidemia    S/P AVR (aortic valve replacement) - Primary    Current use of long term anticoagulation   Other Visit Diagnoses     Medicare annual wellness visit, subsequent               Preventive health issues were discussed with patient, and age appropriate screening tests were ordered as noted in patient's After Visit Summary  Personalized health advice and appropriate referrals for health education or preventive services given if needed, as noted in patient's After Visit Summary       History of Present Illness:     Patient presents for a Medicare Wellness Visit    HPI   Patient Care Team:  Tricia Mora MD as PCP - General (Internal Medicine)     Review of Systems:     Review of Systems     Problem List:     Patient Active Problem List   Diagnosis   • Venous insufficiency   • Hypertension   • Hyperlipidemia   • Erectile dysfunction of non-organic origin   • S/P AVR (aortic valve replacement)   • Presbyesophagus   • Atrial flutter (HCC)   • Current use of long term anticoagulation      Past Medical and Surgical History:     Past Medical History:   Diagnosis Date   • [facility]    • Acute bacterial prostatitis     last assessed: 5/29/15   • Aortic valve stenosis     last assessed: 03/10/15   • Coronary atherosclerosis     last assessed: 03/10/15   • Hypertension    • Left inguinal hernia     last assessed: 03/10/15   • Mitral valve disorder    • Status post laparoscopic hernia repair     last assessed: 04/14/15     Past Surgical History:   Procedure Laterality Date   • AORTIC VALVE REPLACEMENT  02/2008   • CATARACT EXTRACTION  09/08/2014   • ERCP     • INGUINAL HERNIA REPAIR Right     2012   • INGUINAL HERNIA REPAIR Left     2017   • AR LAPAROSCOPY SURG CHOLECYSTECTOMY N/A 5/23/2022    Procedure: Subtotal Feneastrated CHOLECYSTECTOMY LAPAROSCOPIC; Surgeon: Bubba Caruso MD;  Location: [facility];  Service: General   • TONSILLECTOMY        Family History:     Family History   Problem Relation Age of Onset   • Leukemia Mother    • Coronary artery disease Father    • Leukemia Father    • Coronary artery disease Other    • Leukemia Other       Social History:     Social History     Socioeconomic History   • Marital status:      Spouse name: Not on file   • Number of children: Not on file   • Years of education: Not on file   • Highest education level: Not on file   Occupational History   • Not on file   Tobacco Use   • Smoking status: Former     Types: Cigars, Pipe     Quit date: 2005     Years since quittin 4   • Smokeless tobacco: Never   Vaping Use   • Vaping Use: Never used   Substance and Sexual Activity   • Alcohol use: Not Currently     Comment: rarely a beer   • Drug use: Never   • Sexual activity: Not Currently   Other Topics Concern   • Not on file   Social History Narrative   • Not on file     Social Determinants of Health     Financial Resource Strain: Not on file   Food Insecurity: Not on file   Transportation Needs: Not on file   Physical Activity: Not on file   Stress: Not on file   Social Connections: Not on file   Intimate Partner Violence: Not on file   Housing Stability: Not on file      Medications and Allergies:     Current Outpatient Medications   Medication Sig Dispense Refill   • Ascorbic Acid (VITAMIN C PO) Take by mouth in the morning     • Cholecalciferol (VITAMIN D3 PO) Take by mouth in the morning     • Coenzyme Q10 (CO Q-10 PO) Take by mouth in the morning     • furosemide (LASIX) 20 mg tablet Take 20 mg by mouth as needed in the morning       • GRAPE SEED EXTRACT PO Take by mouth in the morning     • MAGNESIUM PO Take by mouth daily     • Misc Natural Products (DAILY HERBS LEG VEIN/CIRCULATI PO) Take by mouth in the morning VEIN SUPREME     • Misc Natural Products (OSTEO BI-FLEX JOINT SHIELD PO) Take by mouth in the morning     • Multiple Vitamins-Minerals (EYE VITAMINS PO) Take by mouth in the morning MACUGUARD     • Multiple Vitamins-Minerals (ZINC PO) Take by mouth in the morning     • NATTOKINASE PO Take by mouth in the morning     • Nutritional Supplements (DHEA PO) Take by mouth in the morning     • Probiotic Product (PROBIOTIC PO) Take by mouth in the morning     • QUERCETIN PO Take by mouth in the morning     • Red Yeast Rice Extract (RED YEAST RICE PO) Take by mouth 2 (two) times a day     • RESVERATROL PO Take by mouth in the morning     • RUTIN PO Take by mouth in the morning     • SELENIUM PO Take by mouth in the morning     • Thiamine HCl (VITAMIN B-1 PO) Take by mouth daily     • TURMERIC CURCUMIN PO Take by mouth in the morning     • Xarelto 20 MG tablet Take 20 mg by mouth daily after dinner       No current facility-administered medications for this visit  No Known Allergies   Immunizations:     Immunization History   Administered Date(s) Administered   • COVID-19 MODERNA VACC 0 5 ML IM 01/27/2021, 02/22/2021, 12/21/2021   • COVID-19 Moderna vac 6-11y or adult booster 50 mcg/0 5 mL 10/27/2022   • H1N1, All Formulations 01/27/2010   • INFLUENZA 10/22/2021   • Influenza Split High Dose Preservative Free IM 10/16/2013, 10/08/2014, 11/03/2015, 11/10/2017   • Influenza, high dose seasonal 0 7 mL 11/16/2018, 11/15/2019, 10/29/2020, 10/22/2021   • Pneumococcal Polysaccharide PPV23 12/02/2019   • Tdap 12/02/2019      Health Maintenance: There are no preventive care reminders to display for this patient  Topic Date Due   • Pneumococcal Vaccine: 65+ Years (2 - PCV) 12/02/2020   • COVID-19 Vaccine (4 - Moderna series) 12/22/2022      Medicare Screening Tests and Risk Assessments:         Health Risk Assessment:   Patient rates overall health as very good  Patient feels that their physical health rating is slightly worse  Patient is satisfied with their life  Eyesight was rated as same   Hearing was rated as slightly worse  Patient feels that their emotional and mental health rating is same  Patients states they are never, rarely angry  Patient states they are sometimes unusually tired/fatigued  Pain experienced in the last 7 days has been some  Patient's pain rating has been 2/10  Patient states that he has experienced weight loss or gain in last 6 months  Fall Risk Screening: In the past year, patient has experienced: history of falling in past year    Number of falls: 1  Injured during fall?: Yes    Feels unsteady when standing or walking?: No    Worried about falling?: No      Home Safety:  Patient has trouble with stairs inside or outside of their home  Patient has working smoke alarms and has working carbon monoxide detector  Home safety hazards include: none  Nutrition:   Current diet is Low Saturated Fat and No Added Salt  Medications:   Patient is currently taking over-the-counter supplements  OTC medications include: see medication list  Patient is able to manage medications  Activities of Daily Living (ADLs)/Instrumental Activities of Daily Living (IADLs):   Walk and transfer into and out of bed and chair?: Yes  Dress and groom yourself?: Yes    Bathe or shower yourself?: Yes    Feed yourself? Yes  Do your laundry/housekeeping?: Yes  Manage your money, pay your bills and track your expenses?: Yes  Make your own meals?: Yes    Do your own shopping?: Yes    Previous Hospitalizations:   Any hospitalizations or ED visits within the last 12 months?: No      Advance Care Planning:   Living will: Yes    Durable POA for healthcare:  Yes    Advanced directive: Yes      PREVENTIVE SCREENINGS      Cardiovascular Screening:    General: Screening Not Indicated and History Lipid Disorder      Diabetes Screening:     General: Screening Current      Colorectal Cancer Screening:     General: Screening Not Indicated      Prostate Cancer Screening:    General: Screening Not Indicated      Abdominal Aortic Aneurysm (AAA) Screening:    Risk factors include: tobacco use        Lung Cancer Screening:     General: Screening Not Indicated    Screening, Brief Intervention, and Referral to Treatment (SBIRT)    Screening  Typical number of drinks in a day: 1  Typical number of drinks in a week: 4  Interpretation: Low risk drinking behavior  Single Item Drug Screening:  How often have you used an illegal drug (including marijuana) or a prescription medication for non-medical reasons in the past year? never    Single Item Drug Screen Score: 0  Interpretation: Negative screen for possible drug use disorder    No results found  Physical Exam:     There were no vitals taken for this visit      Physical Exam     Latasha Thakkar MD

## 2023-05-24 NOTE — PROGRESS NOTES
INTERNAL MEDICINE OFFICE VISIT       NAME: Forest Huynh  AGE: 80 y o  SEX: male       : 1933        MRN: [id number]    DATE: 2023  TIME: 4:03 PM    Assessment and Plan   1  Medicare annual wellness visit, subsequent    2  Edema, unspecified type  -     spironolactone (ALDACTONE) 25 mg tablet; One tablet daily in the morning with Furosemide  -     furosemide (LASIX) 20 mg tablet; Take 1 tablet (20 mg total) by mouth daily    3  Primary hypertension  -     CBC and differential  -     Comprehensive metabolic panel  -     Urinalysis with microscopic    4  S/P AVR (aortic valve replacement)  -     CBC and differential    5  Typical atrial flutter (HCC)  -     CBC and differential    6  Current use of long term anticoagulation  -     CBC and differential    7  Familial hypercholesterolemia  -     Lipid panel; Future         Patient Instructions       Medicare Preventive Visit Patient Instructions  Thank you for completing your Welcome to Medicare Visit or Medicare Annual Wellness Visit today  Your next wellness visit will be due in one year (2024)  The screening/preventive services that you may require over the next 5-10 years are detailed below  Some tests may not apply to you based off risk factors and/or age  Screening tests ordered at today's visit but not completed yet may show as past due  Also, please note that scanned in results may not display below  Preventive Screenings:  Service Recommendations Previous Testing/Comments   Colorectal Cancer Screening  · Colonoscopy    · Fecal Occult Blood Test (FOBT)/Fecal Immunochemical Test (FIT)  · Fecal DNA/Cologuard Test  · Flexible Sigmoidoscopy Age: 39-70 years old   Colonoscopy: every 10 years (May be performed more frequently if at higher risk)  OR  FOBT/FIT: every 1 year  OR  Cologuard: every 3 years  OR  Sigmoidoscopy: every 5 years  Screening may be recommended earlier than age 39 if at higher risk for colorectal cancer   Also, an individualized decision between you and your healthcare provider will decide whether screening between the ages of 74-80 would be appropriate  Colonoscopy: Not on file  FOBT/FIT: Not on file  Cologuard: Not on file  Sigmoidoscopy: Not on file    Screening Not Indicated     Prostate Cancer Screening Individualized decision between patient and health care provider in men between ages of 53-78   Medicare will cover every 12 months beginning on the day after your 50th birthday PSA: No results in last 5 years     Screening Not Indicated     Hepatitis C Screening Once for adults born between [facility]  More frequently in patients at high risk for Hepatitis C Hep C Antibody: Not on file        Diabetes Screening 1-2 times per year if you're at risk for diabetes or have pre-diabetes Fasting glucose: 91 mg/dL (12/13/2022)  A1C: No results in last 5 years (No results in last 5 years)  Screening Current   Cholesterol Screening Once every 5 years if you don't have a lipid disorder  May order more often based on risk factors  Lipid panel: 01/23/2020  Screening Not Indicated  History Lipid Disorder      Other Preventive Screenings Covered by Medicare:  1  Abdominal Aortic Aneurysm (AAA) Screening: covered once if your at risk  You're considered to be at risk if you have a family history of AAA or a male between the age of 73-68 who smoking at least 100 cigarettes in your lifetime  2  Lung Cancer Screening: covers low dose CT scan once per year if you meet all of the following conditions: (1) Age 50-69; (2) No signs or symptoms of lung cancer; (3) Current smoker or have quit smoking within the last 15 years; (4) You have a tobacco smoking history of at least 20 pack years (packs per day x number of years you smoked); (5) You get a written order from a healthcare provider    3  Glaucoma Screening: covered annually if you're considered high risk: (1) You have diabetes OR (2) Family history of glaucoma OR (3)  aged 48 and older OR (3)  American aged 72 and older  3  Osteoporosis Screening: covered every 2 years if you meet one of the following conditions: (1) Have a vertebral abnormality; (2) On glucocorticoid therapy for more than 3 months; (3) Have primary hyperparathyroidism; (4) On osteoporosis medications and need to assess response to drug therapy  5  HIV Screening: covered annually if you're between the age of 12-76  Also covered annually if you are younger than 13 and older than 72 with risk factors for HIV infection  For pregnant patients, it is covered up to 3 times per pregnancy  Immunizations:  Immunization Recommendations   Influenza Vaccine Annual influenza vaccination during flu season is recommended for all persons aged >= 6 months who do not have contraindications   Pneumococcal Vaccine   * Pneumococcal conjugate vaccine = PCV13 (Prevnar 13), PCV15 (Vaxneuvance), PCV20 (Prevnar 20)  * Pneumococcal polysaccharide vaccine = PPSV23 (Pneumovax) Adults 25-60 years old: 1-3 doses may be recommended based on certain risk factors  Adults 72 years old: 1-2 doses may be recommended based off what pneumonia vaccine you previously received   Hepatitis B Vaccine 3 dose series if at intermediate or high risk (ex: diabetes, end stage renal disease, liver disease)   Tetanus (Td) Vaccine - COST NOT COVERED BY MEDICARE PART B Following completion of primary series, a booster dose should be given every 10 years to maintain immunity against tetanus  Td may also be given as tetanus wound prophylaxis  Tdap Vaccine - COST NOT COVERED BY MEDICARE PART B Recommended at least once for all adults  For pregnant patients, recommended with each pregnancy  Shingles Vaccine (Shingrix) - COST NOT COVERED BY MEDICARE PART B  2 shot series recommended in those aged 48 and above     Health Maintenance Due:  There are no preventive care reminders to display for this patient    Immunizations Due:      Topic Date Due   • Pneumococcal Vaccine: 65+ Years (2 - PCV) 12/02/2020   • COVID-19 Vaccine (4 - Moderna series) 12/22/2022     Advance Directives   What are advance directives? Advance directives are legal documents that state your wishes and plans for medical care  These plans are made ahead of time in case you lose your ability to make decisions for yourself  Advance directives can apply to any medical decision, such as the treatments you want, and if you want to donate organs  What are the types of advance directives? There are many types of advance directives, and each state has rules about how to use them  You may choose a combination of any of the following:  · Living will: This is a written record of the treatment you want  You can also choose which treatments you do not want, which to limit, and which to stop at a certain time  This includes surgery, medicine, IV fluid, and tube feedings  · Durable power of  for healthcare [facility]): This is a written record that states who you want to make healthcare choices for you when you are unable to make them for yourself  This person, called a proxy, is usually a family member or a friend  You may choose more than 1 proxy  · Do not resuscitate (DNR) order:  A DNR order is used in case your heart stops beating or you stop breathing  It is a request not to have certain forms of treatment, such as CPR  A DNR order may be included in other types of advance directives  · Medical directive: This covers the care that you want if you are in a coma, near death, or unable to make decisions for yourself  You can list the treatments you want for each condition  Treatment may include pain medicine, surgery, blood transfusions, dialysis, IV or tube feedings, and a ventilator (breathing machine)  · Values history: This document has questions about your views, beliefs, and how you feel and think about life   This information can help others choose the care that you would choose  Why are advance directives important? An advance directive helps you control your care  Although spoken wishes may be used, it is better to have your wishes written down  Spoken wishes can be misunderstood, or not followed  Treatments may be given even if you do not want them  An advance directive may make it easier for your family to make difficult choices about your care  Fall Prevention    Fall prevention  includes ways to make your home and other areas safer  It also includes ways you can move more carefully to prevent a fall  Health conditions that cause changes in your blood pressure, vision, or muscle strength and coordination may increase your risk for falls  Medicines may also increase your risk for falls if they make you dizzy, weak, or sleepy  Fall prevention tips:   · Stand or sit up slowly  · Use assistive devices as directed  · Wear shoes that fit well and have soles that   · Wear a personal alarm  · Stay active  · Manage your medical conditions  Home Safety Tips:  · Add items to prevent falls in the bathroom  · Keep paths clear  · Install bright lights in your home  · Keep items you use often on shelves within reach  · Paint or place reflective tape on the edges of your stairs  Weight Management   Why it is important to manage your weight:  Being overweight increases your risk of health conditions such as heart disease, high blood pressure, type 2 diabetes, and certain types of cancer  It can also increase your risk for osteoarthritis, sleep apnea, and other respiratory problems  Aim for a slow, steady weight loss  Even a small amount of weight loss can lower your risk of health problems  How to lose weight safely:  A safe and healthy way to lose weight is to eat fewer calories and get regular exercise  You can lose up about 1 pound a week by decreasing the number of calories you eat by 500 calories each day     Healthy meal plan for weight management:  A healthy meal plan includes a variety of foods, contains fewer calories, and helps you stay healthy  A healthy meal plan includes the following:  · Eat whole-grain foods more often  A healthy meal plan should contain fiber  Fiber is the part of grains, fruits, and vegetables that is not broken down by your body  Whole-grain foods are healthy and provide extra fiber in your diet  Some examples of whole-grain foods are whole-wheat breads and pastas, oatmeal, brown rice, and bulgur  · Eat a variety of vegetables every day  Include dark, leafy greens such as spinach, kale, nawaf greens, and mustard greens  Eat yellow and orange vegetables such as carrots, sweet potatoes, and winter squash  · Eat a variety of fruits every day  Choose fresh or canned fruit (canned in its own juice or light syrup) instead of juice  Fruit juice has very little or no fiber  · Eat low-fat dairy foods  Drink fat-free (skim) milk or 1% milk  Eat fat-free yogurt and low-fat cottage cheese  Try low-fat cheeses such as mozzarella and other reduced-fat cheeses  · Choose meat and other protein foods that are low in fat  Choose beans or other legumes such as split peas or lentils  Choose fish, skinless poultry (chicken or turkey), or lean cuts of red meat (beef or pork)  Before you cook meat or poultry, cut off any visible fat  · Use less fat and oil  Try baking foods instead of frying them  Add less fat, such as margarine, sour cream, regular salad dressing and mayonnaise to foods  Eat fewer high-fat foods  Some examples of high-fat foods include french fries, doughnuts, ice cream, and cakes  · Eat fewer sweets  Limit foods and drinks that are high in sugar  This includes candy, cookies, regular soda, and sweetened drinks  Exercise:  Exercise at least 30 minutes per day on most days of the week  Some examples of exercise include walking, biking, dancing, and swimming   You can also fit in more physical activity by taking the stairs instead of the elevator or parking farther away from stores  Ask your healthcare provider about the best exercise plan for you  © Copyright [employer] 2018 Information is for End User's use only and may not be sold, redistributed or otherwise used for commercial purposes  All illustrations and images included in CareNotes® are the copyrighted property of [employer]  or [address] [place] [address]          Chief Complaint     Chief Complaint   Patient presents with   • Follow-up   • Medicare Wellness Visit       History of Present Illness   Christie Montenegro is a 80y o -year-old male who returns for 5-month follow-up visit  2 weeks ago he saw his cardiologist, Dr Quita Izquierdo of the heart care group who sees him every 6 months  Notes are not available through this group  Deonte Manzano was experiencing diarrhea on furosemide  This medicine was discontinued by his cardiologist and he was started on a combination of Bumex 1 mg daily and Aldactone 25 mg 3 times weekly  Both of these medicines caused worsening of diarrhea so he stopped them 1 week ago after taking them for 1 week  He began gaining weight and noticed significant edema starting 2 days ago  Weight is increased 11 pounds in the last week  Deonte Manzano has no PND, no change in dyspnea on moderate exertion but not on exertion  There is no angina  He is not symptomatic regarding atrial flutter  He is not controlled with beta-blocker or other rate control medications  He continues Xarelto and does not notice any significant clinical bleeding but does bruise easily  Blood pressure is trended up with fluid retention  He follows a low-sodium diet strictly  Status post aortic valve replacement, he has had no TIA symptoms  As noted above, no CHF symptoms are noted  Appetite remains good  Cognitive status is completely intact  We discussion about management    He has obvious significant fluid overload and plan is to continue furosemide 20 mg daily as this cause less diarrhea than Bumex  He will restart Aldactone 25 mg daily  He will take Imodium as needed for diarrhea  He will undergo today's lab work orders tomorrow and I will contact him with results  Close follow-up was recommended and he will be seeing in 1 week we will also repeat lab work at that time including basic metabolic panel and magnesium  Review of Systems   Review of Systems as above    Active Problem List     Patient Active Problem List   Diagnosis   • Venous insufficiency   • Hypertension   • Hyperlipidemia   • Erectile dysfunction of non-organic origin   • S/P AVR (aortic valve replacement)   • Presbyesophagus   • Atrial flutter (HCC)   • Current use of long term anticoagulation       The following portions of the patient's history were reviewed and updated as appropriate: allergies, current medications, past family history, past medical history, past social history, past surgical history, and problem list     Objective     Vitals:    05/24/23 1527   BP: 142/84   Pulse: 65   Temp: 98 °F (36 7 °C)   SpO2: 97%     Wt Readings from Last 3 Encounters:   05/24/23 98 2 kg (216 lb 6 4 oz)   12/28/22 93 1 kg (205 lb 3 2 oz)   07/27/22 90 3 kg (199 lb)       Physical Exam     Vital signs stable, alert and oriented in no distress  Pulse is irregularly irregular which is unchanged from previous  Skin warm and dry without pallor or icterus  There is no respiratory distress  Normocephalic atraumatic  Slight decreased hearing with good vision  Balance and strength are excellent  Weight is increased 11 pounds from last visit  There is obvious worsening pitting lower leg edema with support stockings in place  There is no JVD  The lungs are clear including no rales  There is no accessory use of muscles respiration  There is no cyanosis  Head and neck without adenopathy or mass  Trachea midline without stridor    Cardiac: Regularly irregular rhythm, normal S1 and S2, no audible S4 or S3, grade 2 systolic murmur heard throughout the precordium and loudest over the aortic area, no diastolic murmur, no rub  There are normal bowel sounds and the abdomen is nontender and soft  Peripheral circulation is intact  There is no tenderness or phlebitic findings nor any dermatitis or cellulitis of the lower extremities  Pitting edema extends from the feet to the knees  Edema is +3              Orders Placed This Encounter   Procedures   • CBC and differential   • Comprehensive metabolic panel   • Lipid panel   • Urinalysis with microscopic       ALLERGIES:  No Known Allergies    Current Medications     Current Outpatient Medications   Medication Sig Dispense Refill   • Ascorbic Acid (VITAMIN C PO) Take by mouth in the morning     • ASTAXANTHIN PO Take 4 mg by mouth in the morning 1 or 2 tab per day     • Cholecalciferol (VITAMIN D3 PO) Take by mouth in the morning     • Coenzyme Q10 (CO Q-10 PO) Take by mouth in the morning     • furosemide (LASIX) 20 mg tablet Take 1 tablet (20 mg total) by mouth daily 30 tablet 1   • GRAPE SEED EXTRACT PO Take by mouth in the morning     • MAGNESIUM PO Take by mouth daily     • Misc Natural Products (DAILY HERBS LEG VEIN/CIRCULATI PO) Take by mouth in the morning VEIN SUPREME     • Misc Natural Products (OSTEO BI-FLEX JOINT SHIELD PO) Take by mouth in the morning     • Multiple Vitamins-Minerals (EYE VITAMINS PO) Take by mouth in the morning MACUGUARD     • Multiple Vitamins-Minerals (ZINC PO) Take by mouth in the morning     • NATTOKINASE PO Take by mouth in the morning     • Nutritional Supplements (DHEA PO) Take by mouth in the morning     • Probiotic Product (PROBIOTIC PO) Take by mouth in the morning     • QUERCETIN PO Take by mouth in the morning     • Red Yeast Rice Extract (RED YEAST RICE PO) Take by mouth 2 (two) times a day     • RESVERATROL PO Take by mouth in the morning     • SELENIUM PO Take by mouth in the morning     • spironolactone (ALDACTONE) 25 mg tablet One tablet daily in the morning with Furosemide 30 tablet 1   • Taurine 1000 MG CAPS Take 1,000 mg by mouth in the morning     • Thiamine HCl (VITAMIN B-1 PO) Take by mouth daily     • TURMERIC CURCUMIN PO Take by mouth in the morning     • Xarelto 20 MG tablet Take 20 mg by mouth daily after dinner       No current facility-administered medications for this visit           Anh Martin MD

## 2023-05-24 NOTE — PATIENT INSTRUCTIONS
Medicare Preventive Visit Patient Instructions  Thank you for completing your Welcome to Medicare Visit or Medicare Annual Wellness Visit today  Your next wellness visit will be due in one year (5/24/2024)  The screening/preventive services that you may require over the next 5-10 years are detailed below  Some tests may not apply to you based off risk factors and/or age  Screening tests ordered at today's visit but not completed yet may show as past due  Also, please note that scanned in results may not display below  Preventive Screenings:  Service Recommendations Previous Testing/Comments   Colorectal Cancer Screening  · Colonoscopy    · Fecal Occult Blood Test (FOBT)/Fecal Immunochemical Test (FIT)  · Fecal DNA/Cologuard Test  · Flexible Sigmoidoscopy Age: 39-70 years old   Colonoscopy: every 10 years (May be performed more frequently if at higher risk)  OR  FOBT/FIT: every 1 year  OR  Cologuard: every 3 years  OR  Sigmoidoscopy: every 5 years  Screening may be recommended earlier than age 39 if at higher risk for colorectal cancer  Also, an individualized decision between you and your healthcare provider will decide whether screening between the ages of 74-80 would be appropriate   Colonoscopy: Not on file  FOBT/FIT: Not on file  Cologuard: Not on file  Sigmoidoscopy: Not on file    Screening Not Indicated     Prostate Cancer Screening Individualized decision between patient and health care provider in men between ages of 53-78   Medicare will cover every 12 months beginning on the day after your 50th birthday PSA: No results in last 5 years     Screening Not Indicated     Hepatitis C Screening Once for adults born between [facility]  More frequently in patients at high risk for Hepatitis C Hep C Antibody: Not on file        Diabetes Screening 1-2 times per year if you're at risk for diabetes or have pre-diabetes Fasting glucose: 91 mg/dL (12/13/2022)  A1C: No results in last 5 years (No results in last 5 years)  Screening Current   Cholesterol Screening Once every 5 years if you don't have a lipid disorder  May order more often based on risk factors  Lipid panel: 01/23/2020  Screening Not Indicated  History Lipid Disorder      Other Preventive Screenings Covered by Medicare:  1  Abdominal Aortic Aneurysm (AAA) Screening: covered once if your at risk  You're considered to be at risk if you have a family history of AAA or a male between the age of 73-68 who smoking at least 100 cigarettes in your lifetime  2  Lung Cancer Screening: covers low dose CT scan once per year if you meet all of the following conditions: (1) Age 50-69; (2) No signs or symptoms of lung cancer; (3) Current smoker or have quit smoking within the last 15 years; (4) You have a tobacco smoking history of at least 20 pack years (packs per day x number of years you smoked); (5) You get a written order from a healthcare provider  3  Glaucoma Screening: covered annually if you're considered high risk: (1) You have diabetes OR (2) Family history of glaucoma OR (3)  aged 48 and older OR (3)  American aged 72 and older  3  Osteoporosis Screening: covered every 2 years if you meet one of the following conditions: (1) Have a vertebral abnormality; (2) On glucocorticoid therapy for more than 3 months; (3) Have primary hyperparathyroidism; (4) On osteoporosis medications and need to assess response to drug therapy  5  HIV Screening: covered annually if you're between the age of 12-76  Also covered annually if you are younger than 13 and older than 72 with risk factors for HIV infection  For pregnant patients, it is covered up to 3 times per pregnancy      Immunizations:  Immunization Recommendations   Influenza Vaccine Annual influenza vaccination during flu season is recommended for all persons aged >= 6 months who do not have contraindications   Pneumococcal Vaccine   * Pneumococcal conjugate vaccine = PCV13 (Prevnar 13), PCV15 (Vaxneuvance), PCV20 (Prevnar 20)  * Pneumococcal polysaccharide vaccine = PPSV23 (Pneumovax) Adults 2364 years old: 1-3 doses may be recommended based on certain risk factors  Adults 72 years old: 1-2 doses may be recommended based off what pneumonia vaccine you previously received   Hepatitis B Vaccine 3 dose series if at intermediate or high risk (ex: diabetes, end stage renal disease, liver disease)   Tetanus (Td) Vaccine - COST NOT COVERED BY MEDICARE PART B Following completion of primary series, a booster dose should be given every 10 years to maintain immunity against tetanus  Td may also be given as tetanus wound prophylaxis  Tdap Vaccine - COST NOT COVERED BY MEDICARE PART B Recommended at least once for all adults  For pregnant patients, recommended with each pregnancy  Shingles Vaccine (Shingrix) - COST NOT COVERED BY MEDICARE PART B  2 shot series recommended in those aged 48 and above     Health Maintenance Due:  There are no preventive care reminders to display for this patient  Immunizations Due:      Topic Date Due   • Pneumococcal Vaccine: 65+ Years (2 - PCV) 12/02/2020   • COVID-19 Vaccine (4 - Moderna series) 12/22/2022     Advance Directives   What are advance directives? Advance directives are legal documents that state your wishes and plans for medical care  These plans are made ahead of time in case you lose your ability to make decisions for yourself  Advance directives can apply to any medical decision, such as the treatments you want, and if you want to donate organs  What are the types of advance directives? There are many types of advance directives, and each state has rules about how to use them  You may choose a combination of any of the following:  · Living will: This is a written record of the treatment you want  You can also choose which treatments you do not want, which to limit, and which to stop at a certain time   This includes surgery, medicine, IV fluid, and tube feedings  · Durable power of  for healthcare [facility]): This is a written record that states who you want to make healthcare choices for you when you are unable to make them for yourself  This person, called a proxy, is usually a family member or a friend  You may choose more than 1 proxy  · Do not resuscitate (DNR) order:  A DNR order is used in case your heart stops beating or you stop breathing  It is a request not to have certain forms of treatment, such as CPR  A DNR order may be included in other types of advance directives  · Medical directive: This covers the care that you want if you are in a coma, near death, or unable to make decisions for yourself  You can list the treatments you want for each condition  Treatment may include pain medicine, surgery, blood transfusions, dialysis, IV or tube feedings, and a ventilator (breathing machine)  · Values history: This document has questions about your views, beliefs, and how you feel and think about life  This information can help others choose the care that you would choose  Why are advance directives important? An advance directive helps you control your care  Although spoken wishes may be used, it is better to have your wishes written down  Spoken wishes can be misunderstood, or not followed  Treatments may be given even if you do not want them  An advance directive may make it easier for your family to make difficult choices about your care  Fall Prevention    Fall prevention  includes ways to make your home and other areas safer  It also includes ways you can move more carefully to prevent a fall  Health conditions that cause changes in your blood pressure, vision, or muscle strength and coordination may increase your risk for falls  Medicines may also increase your risk for falls if they make you dizzy, weak, or sleepy  Fall prevention tips:   · Stand or sit up slowly  · Use assistive devices as directed      · Wear shoes that fit well and have soles that   · Wear a personal alarm  · Stay active  · Manage your medical conditions  Home Safety Tips:  · Add items to prevent falls in the bathroom  · Keep paths clear  · Install bright lights in your home  · Keep items you use often on shelves within reach  · Paint or place reflective tape on the edges of your stairs  Weight Management   Why it is important to manage your weight:  Being overweight increases your risk of health conditions such as heart disease, high blood pressure, type 2 diabetes, and certain types of cancer  It can also increase your risk for osteoarthritis, sleep apnea, and other respiratory problems  Aim for a slow, steady weight loss  Even a small amount of weight loss can lower your risk of health problems  How to lose weight safely:  A safe and healthy way to lose weight is to eat fewer calories and get regular exercise  You can lose up about 1 pound a week by decreasing the number of calories you eat by 500 calories each day  Healthy meal plan for weight management:  A healthy meal plan includes a variety of foods, contains fewer calories, and helps you stay healthy  A healthy meal plan includes the following:  · Eat whole-grain foods more often  A healthy meal plan should contain fiber  Fiber is the part of grains, fruits, and vegetables that is not broken down by your body  Whole-grain foods are healthy and provide extra fiber in your diet  Some examples of whole-grain foods are whole-wheat breads and pastas, oatmeal, brown rice, and bulgur  · Eat a variety of vegetables every day  Include dark, leafy greens such as spinach, kale, nawaf greens, and mustard greens  Eat yellow and orange vegetables such as carrots, sweet potatoes, and winter squash  · Eat a variety of fruits every day  Choose fresh or canned fruit (canned in its own juice or light syrup) instead of juice  Fruit juice has very little or no fiber    · Eat low-fat dairy foods   Drink fat-free (skim) milk or 1% milk  Eat fat-free yogurt and low-fat cottage cheese  Try low-fat cheeses such as mozzarella and other reduced-fat cheeses  · Choose meat and other protein foods that are low in fat  Choose beans or other legumes such as split peas or lentils  Choose fish, skinless poultry (chicken or turkey), or lean cuts of red meat (beef or pork)  Before you cook meat or poultry, cut off any visible fat  · Use less fat and oil  Try baking foods instead of frying them  Add less fat, such as margarine, sour cream, regular salad dressing and mayonnaise to foods  Eat fewer high-fat foods  Some examples of high-fat foods include french fries, doughnuts, ice cream, and cakes  · Eat fewer sweets  Limit foods and drinks that are high in sugar  This includes candy, cookies, regular soda, and sweetened drinks  Exercise:  Exercise at least 30 minutes per day on most days of the week  Some examples of exercise include walking, biking, dancing, and swimming  You can also fit in more physical activity by taking the stairs instead of the elevator or parking farther away from stores  Ask your healthcare provider about the best exercise plan for you  © Copyright [employer] 2018 Information is for End User's use only and may not be sold, redistributed or otherwise used for commercial purposes   All illustrations and images included in CareNotes® are the copyrighted property of A D A M , Inc  or [address] [place] [address]

## 2023-05-25 ENCOUNTER — APPOINTMENT (OUTPATIENT)
Dept: LAB | Facility: MEDICAL CENTER | Age: 88
End: 2023-05-25

## 2023-05-25 DIAGNOSIS — E78.01 FAMILIAL HYPERCHOLESTEROLEMIA: ICD-10-CM

## 2023-05-25 LAB
ALBUMIN SERPL BCP-MCNC: 3.8 G/DL (ref 3.5–5)
ALP SERPL-CCNC: 77 U/L (ref 46–116)
ALT SERPL W P-5'-P-CCNC: 37 U/L (ref 12–78)
ANION GAP SERPL CALCULATED.3IONS-SCNC: -1 MMOL/L (ref 4–13)
AST SERPL W P-5'-P-CCNC: 27 U/L (ref 5–45)
BACTERIA UR QL AUTO: ABNORMAL /HPF
BASOPHILS # BLD AUTO: 0.07 THOUSANDS/ÂΜL (ref 0–0.1)
BASOPHILS NFR BLD AUTO: 1 % (ref 0–1)
BILIRUB SERPL-MCNC: 0.87 MG/DL (ref 0.2–1)
BILIRUB UR QL STRIP: NEGATIVE
BUN SERPL-MCNC: 28 MG/DL (ref 5–25)
CALCIUM SERPL-MCNC: 9.1 MG/DL (ref 8.3–10.1)
CHLORIDE SERPL-SCNC: 117 MMOL/L (ref 96–108)
CHOLEST SERPL-MCNC: 144 MG/DL
CLARITY UR: CLEAR
CO2 SERPL-SCNC: 24 MMOL/L (ref 21–32)
COLOR UR: YELLOW
CREAT SERPL-MCNC: 1.01 MG/DL (ref 0.6–1.3)
EOSINOPHIL # BLD AUTO: 0.23 THOUSAND/ÂΜL (ref 0–0.61)
EOSINOPHIL NFR BLD AUTO: 4 % (ref 0–6)
ERYTHROCYTE [DISTWIDTH] IN BLOOD BY AUTOMATED COUNT: 14.7 % (ref 11.6–15.1)
GFR SERPL CREATININE-BSD FRML MDRD: 65 ML/MIN/1.73SQ M
GLUCOSE P FAST SERPL-MCNC: 92 MG/DL (ref 65–99)
GLUCOSE UR STRIP-MCNC: NEGATIVE MG/DL
HCT VFR BLD AUTO: 40.8 % (ref 36.5–49.3)
HDLC SERPL-MCNC: 42 MG/DL
HGB BLD-MCNC: 12.8 G/DL (ref 12–17)
HGB UR QL STRIP.AUTO: NEGATIVE
HYALINE CASTS #/AREA URNS LPF: ABNORMAL /LPF
IMM GRANULOCYTES # BLD AUTO: 0.01 THOUSAND/UL (ref 0–0.2)
IMM GRANULOCYTES NFR BLD AUTO: 0 % (ref 0–2)
KETONES UR STRIP-MCNC: NEGATIVE MG/DL
LDLC SERPL CALC-MCNC: 91 MG/DL (ref 0–100)
LEUKOCYTE ESTERASE UR QL STRIP: NEGATIVE
LYMPHOCYTES # BLD AUTO: 1.48 THOUSANDS/ÂΜL (ref 0.6–4.47)
LYMPHOCYTES NFR BLD AUTO: 26 % (ref 14–44)
MCH RBC QN AUTO: 29.6 PG (ref 26.8–34.3)
MCHC RBC AUTO-ENTMCNC: 31.4 G/DL (ref 31.4–37.4)
MCV RBC AUTO: 94 FL (ref 82–98)
MONOCYTES # BLD AUTO: 0.57 THOUSAND/ÂΜL (ref 0.17–1.22)
MONOCYTES NFR BLD AUTO: 10 % (ref 4–12)
NEUTROPHILS # BLD AUTO: 3.38 THOUSANDS/ÂΜL (ref 1.85–7.62)
NEUTS SEG NFR BLD AUTO: 59 % (ref 43–75)
NITRITE UR QL STRIP: NEGATIVE
NON-SQ EPI CELLS URNS QL MICRO: ABNORMAL /HPF
NONHDLC SERPL-MCNC: 102 MG/DL
NRBC BLD AUTO-RTO: 0 /100 WBCS
PH UR STRIP.AUTO: 5.5 [PH]
PLATELET # BLD AUTO: 139 THOUSANDS/UL (ref 149–390)
PMV BLD AUTO: 11.5 FL (ref 8.9–12.7)
POTASSIUM SERPL-SCNC: 4.4 MMOL/L (ref 3.5–5.3)
PROT SERPL-MCNC: 7.1 G/DL (ref 6.4–8.4)
PROT UR STRIP-MCNC: ABNORMAL MG/DL
RBC # BLD AUTO: 4.32 MILLION/UL (ref 3.88–5.62)
RBC #/AREA URNS AUTO: ABNORMAL /HPF
SODIUM SERPL-SCNC: 140 MMOL/L (ref 135–147)
SP GR UR STRIP.AUTO: 1.02 (ref 1–1.03)
TRIGL SERPL-MCNC: 56 MG/DL
UROBILINOGEN UR STRIP-ACNC: <2 MG/DL
WBC # BLD AUTO: 5.74 THOUSAND/UL (ref 4.31–10.16)
WBC #/AREA URNS AUTO: ABNORMAL /HPF

## 2023-05-30 ENCOUNTER — OFFICE VISIT (OUTPATIENT)
Dept: INTERNAL MEDICINE CLINIC | Facility: CLINIC | Age: 88
End: 2023-05-30

## 2023-05-30 VITALS
DIASTOLIC BLOOD PRESSURE: 74 MMHG | WEIGHT: 210.4 LBS | BODY MASS INDEX: 27.76 KG/M2 | HEART RATE: 64 BPM | OXYGEN SATURATION: 97 % | SYSTOLIC BLOOD PRESSURE: 137 MMHG | TEMPERATURE: 97.1 F

## 2023-05-30 DIAGNOSIS — I10 PRIMARY HYPERTENSION: ICD-10-CM

## 2023-05-30 DIAGNOSIS — I87.2 EDEMA OF BOTH LOWER EXTREMITIES DUE TO PERIPHERAL VENOUS INSUFFICIENCY: Primary | ICD-10-CM

## 2023-05-30 NOTE — PROGRESS NOTES
Assessment and Plan:    1  Edema of both lower extremities due to peripheral venous insufficiency  Assessment & Plan:  Improved bilateral lower extremity edema while on 40 mg daily furosemide and 25 mg daily Aldactone  Previous diuretic induced diarrhea resolved as he started taking his diuretics with meals  Weight down 6 pounds compared to last week  Denies dyspnea, chest discomfort, cough    Continue furosemide and Aldactone  Check BMP and magnesium now  Continue compression stockings and lower extremity leg elevation  Continue follow-up with cardiology- reported f/u in 3 months  Continue low-salt diet  Follow-up in 4 months in clinic    Orders:  -     Basic metabolic panel  -     Magnesium    2  Primary hypertension  Assessment & Plan:  BP stable on diuretics  Continue current regimen    Orders:  -     Basic metabolic panel  -     Magnesium               HPI:     Jose Roberto Martin is  80 y o  male with history of HTN, LE edema from venous insufficiency, S/P AVT and A-Flutter, that arrives to clinic for a 1 wk follow-up on LE edmea  Previously, was on lasix, developed GI sxs, then switched to Bumex, GI sxs became worse and he stopped diuretics  Recently seen in clinic for LE edema, and was restarted on lasix  Has been taking Furosemide 40 mg daily and Aldactone 25 mg daily  Patient reports improved LE swelling, resolved diarrhea after taking diuretics with meals  Did not require loperamide  Denies dyspnea, chest pain, cough, abd distension  Patient's diet is limited on salt intake  Reports nail fungus that is managing with topical OTC medications (2)  Patient has no other complaints at this time  Subjective:    Review of Systems   Constitutional: Negative for chills, fatigue, fever and unexpected weight change  HENT: Negative for ear pain and sore throat  Eyes: Negative for pain and visual disturbance  Respiratory: Negative for cough, chest tightness and shortness of breath  Cardiovascular: Positive for leg swelling  Negative for chest pain and palpitations  Gastrointestinal: Negative for abdominal pain, diarrhea, nausea and vomiting  Genitourinary: Negative for difficulty urinating, dysuria, frequency and hematuria  Musculoskeletal: Negative for arthralgias and back pain  Skin: Negative for color change and rash  Neurological: Negative for seizures, syncope, weakness and headaches  All other systems reviewed and are negative         Patient Active Problem List   Diagnosis   • Edema of both lower extremities due to peripheral venous insufficiency   • Hypertension   • Hyperlipidemia   • Erectile dysfunction of non-organic origin   • S/P AVR (aortic valve replacement)   • Presbyesophagus   • Atrial flutter (HCC)   • Current use of long term anticoagulation        Current Outpatient Medications   Medication Sig Dispense Refill   • Ascorbic Acid (VITAMIN C PO) Take by mouth in the morning     • ASTAXANTHIN PO Take 4 mg by mouth in the morning 1 or 2 tab per day     • Cholecalciferol (VITAMIN D3 PO) Take by mouth in the morning     • Coenzyme Q10 (CO Q-10 PO) Take by mouth in the morning     • furosemide (LASIX) 20 mg tablet Take 1 tablet (20 mg total) by mouth daily 30 tablet 1   • GRAPE SEED EXTRACT PO Take by mouth in the morning     • MAGNESIUM PO Take by mouth daily     • Misc Natural Products (DAILY HERBS LEG VEIN/CIRCULATI PO) Take by mouth in the morning VEIN SUPREME     • Misc Natural Products (OSTEO BI-FLEX JOINT SHIELD PO) Take by mouth in the morning     • Multiple Vitamins-Minerals (EYE VITAMINS PO) Take by mouth in the morning MACUGUARD     • Multiple Vitamins-Minerals (ZINC PO) Take by mouth in the morning     • NATTOKINASE PO Take by mouth in the morning     • Nutritional Supplements (DHEA PO) Take by mouth in the morning     • Probiotic Product (PROBIOTIC PO) Take by mouth in the morning     • QUERCETIN PO Take by mouth in the morning     • Red Yeast Rice Extract (RED YEAST RICE PO) Take by mouth 2 (two) times a day     • RESVERATROL PO Take by mouth in the morning     • SELENIUM PO Take by mouth in the morning     • spironolactone (ALDACTONE) 25 mg tablet One tablet daily in the morning with Furosemide 30 tablet 1   • Taurine 1000 MG CAPS Take 1,000 mg by mouth in the morning     • Thiamine HCl (VITAMIN B-1 PO) Take by mouth daily     • TURMERIC CURCUMIN PO Take by mouth in the morning     • Xarelto 20 MG tablet Take 20 mg by mouth daily after dinner       No current facility-administered medications for this visit  No Known Allergies     The following portions of the patient's history were reviewed and updated as appropriate: allergies, current medications, past family history, past medical history, past social history, past surgical history and problem list              Objective:    /74 (BP Location: Left arm, Patient Position: Sitting, Cuff Size: Standard)   Pulse 64   Temp (!) 97 1 °F (36 2 °C)   Wt 95 4 kg (210 lb 6 4 oz)   SpO2 97%   BMI 27 76 kg/m²      Body mass index is 27 76 kg/m²  Physical Exam  Vitals and nursing note reviewed  Constitutional:       General: He is not in acute distress  Appearance: Normal appearance  He is not ill-appearing  HENT:      Head: Normocephalic and atraumatic  Mouth/Throat:      Mouth: Mucous membranes are moist       Pharynx: Oropharynx is clear  Eyes:      General: No scleral icterus  Conjunctiva/sclera: Conjunctivae normal    Neck:      Vascular: No JVD  Cardiovascular:      Rate and Rhythm: Regular rhythm  Bradycardia present  Pulses: Normal pulses  Heart sounds: Murmur heard  Systolic murmur is present  No gallop  Pulmonary:      Effort: Pulmonary effort is normal  No respiratory distress  Breath sounds: Normal breath sounds  No wheezing or rales  Abdominal:      General: Bowel sounds are normal  There is no distension        Palpations: Abdomen is soft  There is no mass  Tenderness: There is no abdominal tenderness  Musculoskeletal:         General: No tenderness  Normal range of motion  Cervical back: Normal range of motion and neck supple  No tenderness  Right lower leg: Edema present  Left lower leg: Edema present  Skin:     General: Skin is warm and dry  Capillary Refill: Capillary refill takes less than 2 seconds  Coloration: Skin is not jaundiced or pale  Neurological:      General: No focal deficit present  Mental Status: He is alert and oriented to person, place, and time  Mental status is at baseline  Sensory: No sensory deficit     Psychiatric:         Mood and Affect: Mood normal          Behavior: Behavior normal

## 2023-05-30 NOTE — ASSESSMENT & PLAN NOTE
· Improved bilateral lower extremity edema while on 40 mg daily furosemide and 25 mg daily Aldactone  · Previous diuretic induced diarrhea resolved as he started taking his diuretics with meals  · Weight down 6 pounds compared to last week  · Denies dyspnea, chest discomfort, cough    · Continue furosemide and Aldactone  · Check BMP and magnesium now  · Continue compression stockings and lower extremity leg elevation  · Continue follow-up with cardiology- reported f/u in 3 months  · Continue low-salt diet  · Follow-up in 4 months in clinic

## 2023-06-15 ENCOUNTER — APPOINTMENT (OUTPATIENT)
Dept: LAB | Facility: MEDICAL CENTER | Age: 88
End: 2023-06-15
Payer: COMMERCIAL

## 2023-06-15 LAB
ANION GAP SERPL CALCULATED.3IONS-SCNC: 2 MMOL/L (ref 4–13)
BUN SERPL-MCNC: 30 MG/DL (ref 5–25)
CALCIUM SERPL-MCNC: 9.2 MG/DL (ref 8.3–10.1)
CHLORIDE SERPL-SCNC: 113 MMOL/L (ref 96–108)
CO2 SERPL-SCNC: 26 MMOL/L (ref 21–32)
CREAT SERPL-MCNC: 0.94 MG/DL (ref 0.6–1.3)
GFR SERPL CREATININE-BSD FRML MDRD: 71 ML/MIN/1.73SQ M
GLUCOSE SERPL-MCNC: 89 MG/DL (ref 65–140)
MAGNESIUM SERPL-MCNC: 2.7 MG/DL (ref 1.6–2.6)
POTASSIUM SERPL-SCNC: 4.5 MMOL/L (ref 3.5–5.3)
SODIUM SERPL-SCNC: 141 MMOL/L (ref 135–147)

## 2023-07-25 DIAGNOSIS — R60.9 EDEMA, UNSPECIFIED TYPE: ICD-10-CM

## 2023-07-25 RX ORDER — FUROSEMIDE 20 MG/1
20 TABLET ORAL DAILY
Qty: 30 TABLET | Refills: 1 | Status: SHIPPED | OUTPATIENT
Start: 2023-07-25

## 2023-11-15 ENCOUNTER — OFFICE VISIT (OUTPATIENT)
Dept: INTERNAL MEDICINE CLINIC | Facility: CLINIC | Age: 88
End: 2023-11-15
Payer: COMMERCIAL

## 2023-11-15 VITALS
DIASTOLIC BLOOD PRESSURE: 78 MMHG | TEMPERATURE: 98.5 F | SYSTOLIC BLOOD PRESSURE: 118 MMHG | WEIGHT: 214 LBS | OXYGEN SATURATION: 98 % | HEART RATE: 63 BPM | BODY MASS INDEX: 28.23 KG/M2

## 2023-11-15 DIAGNOSIS — I48.3 TYPICAL ATRIAL FLUTTER (HCC): ICD-10-CM

## 2023-11-15 DIAGNOSIS — Z95.2 S/P AVR (AORTIC VALVE REPLACEMENT): ICD-10-CM

## 2023-11-15 DIAGNOSIS — I10 PRIMARY HYPERTENSION: Primary | ICD-10-CM

## 2023-11-15 DIAGNOSIS — I87.2 EDEMA OF BOTH LOWER EXTREMITIES DUE TO PERIPHERAL VENOUS INSUFFICIENCY: ICD-10-CM

## 2023-11-15 PROCEDURE — 99213 OFFICE O/P EST LOW 20 MIN: CPT | Performed by: INTERNAL MEDICINE

## 2023-11-15 RX ORDER — SPIRONOLACTONE 25 MG/1
25 TABLET ORAL DAILY
COMMUNITY
Start: 2022-12-01

## 2023-11-15 NOTE — PROGRESS NOTES
INTERNAL MEDICINE OFFICE VISIT       NAME: Albania Major  AGE: 80 y.o. SEX: male       : 1933        MRN: [id number]    DATE: 11/15/2023  TIME: 3:06 PM    Assessment and Plan   1. Primary hypertension    2. Edema of both lower extremities due to peripheral venous insufficiency    3. Typical atrial flutter ([address])    4. S/P AVR (aortic valve replacement)         Follow-up will be in 6 months        Chief Complaint   Follow-up    History of Present Illness   Albania Major is a 80y.o.-year-old male who returns for 6-month follow-up visit. Jacqui Howard is truly a remarkable gentleman who has entirely intact activities of daily living and actively manages his spouses daily life as she has Alzheimer's disease. He does a great job taking care of of [employer]. He will be turning 89 in  and has a big family reunion and birthday party planned. New issues are increased leg edema since receiving flu shot in late October. He gained about 6 or 7 pounds in fluid. He gets a good diuretic response from his current medicines.  labs showed stable GFR 73 and potassium of 4.4. He is asymptomatic from a cardiac standpoint status post ABT and AVR. Cardiology visits are up-to-date. He is very active, doing a lot of work around the house and even outdoors. He has no complaints of any chest pain, palpitations or shortness of breath.     Review of Systems   Review of Systems as above    Active Problem List     Patient Active Problem List   Diagnosis    Edema of both lower extremities due to peripheral venous insufficiency    Hypertension    Hyperlipidemia    Erectile dysfunction of non-organic origin    S/P AVR (aortic valve replacement)    Presbyesophagus    Atrial flutter (HCC)    Current use of long term anticoagulation       The following portions of the patient's history were reviewed and updated as appropriate: allergies, current medications, past family history, past medical history, past social history, past surgical history, and problem list.    Objective     Vitals:    11/15/23 1447   BP: 118/78   Pulse: 63   Temp: 98.5 °F (36.9 °C)   SpO2: 98%     Wt Readings from Last 3 Encounters:   11/15/23 97.1 kg (214 lb)   05/30/23 95.4 kg (210 lb 6.4 oz)   05/24/23 98.2 kg (216 lb 6.4 oz)       Physical Exam    Vital signs stable, alert and oriented no distress appearing much younger than stated age. Cognitive status is entirely intact. Strength and balance are superior. He is well-dressed and well-groomed interacts very well with myself and his spouse. Neuropsych atraumatic. Skin warm and dry, no pallor or icterus. No JVD. Cardiac: Regular rate and rhythm, normal S1 and S2, murmur unchanged, no S3 or rub. Lungs are clear. Peripheral circulation intact. There is pitting edema both lower legs with well fitting knee-high support stockings with good skin integrity.   Gait is stable and normal.                ALLERGIES:  No Known Allergies    Current Medications     Current Outpatient Medications   Medication Sig Dispense Refill    Ascorbic Acid (VITAMIN C PO) Take by mouth in the morning      ASTAXANTHIN PO Take 4 mg by mouth in the morning 1 or 2 tab per day      Cholecalciferol (VITAMIN D3 PO) Take by mouth in the morning      Coenzyme Q10 (CO Q-10 PO) Take by mouth in the morning      furosemide (LASIX) 20 mg tablet TAKE 1 TABLET BY MOUTH EVERY DAY 30 tablet 1    GRAPE SEED EXTRACT PO Take by mouth in the morning      MAGNESIUM PO Take by mouth daily      Misc Natural Products (DAILY HERBS LEG VEIN/CIRCULATI PO) Take by mouth in the morning VEIN SUPREME      Misc Natural Products (OSTEO BI-FLEX JOINT SHIELD PO) Take by mouth in the morning      Multiple Vitamins-Minerals (EYE VITAMINS PO) Take by mouth in the morning MACUGUARD      Multiple Vitamins-Minerals (ZINC PO) Take by mouth in the morning      NATTOKINASE PO Take by mouth in the morning      Nutritional Supplements (DHEA PO) Take by mouth in the morning      Probiotic Product (PROBIOTIC PO) Take by mouth in the morning      QUERCETIN PO Take by mouth in the morning      Red Yeast Rice Extract (RED YEAST RICE PO) Take by mouth 2 (two) times a day      RESVERATROL PO Take by mouth in the morning      SELENIUM PO Take by mouth in the morning      spironolactone (ALDACTONE) 25 mg tablet Take 25 mg by mouth daily      Taurine 1000 MG CAPS Take 1,000 mg by mouth in the morning      Thiamine HCl (VITAMIN B-1 PO) Take by mouth daily      TURMERIC CURCUMIN PO Take by mouth in the morning      Xarelto 20 MG tablet Take 20 mg by mouth daily after dinner       No current facility-administered medications for this visit.          Nuha Magana MD

## 2024-05-15 ENCOUNTER — OFFICE VISIT (OUTPATIENT)
Dept: INTERNAL MEDICINE CLINIC | Facility: CLINIC | Age: 89
End: 2024-05-15
Payer: COMMERCIAL

## 2024-05-15 VITALS
OXYGEN SATURATION: 97 % | DIASTOLIC BLOOD PRESSURE: 58 MMHG | HEART RATE: 73 BPM | TEMPERATURE: 97.6 F | BODY MASS INDEX: 27.05 KG/M2 | WEIGHT: 205 LBS | SYSTOLIC BLOOD PRESSURE: 118 MMHG

## 2024-05-15 DIAGNOSIS — I25.10 CORONARY ARTERY DISEASE INVOLVING NATIVE CORONARY ARTERY OF NATIVE HEART WITHOUT ANGINA PECTORIS: Primary | ICD-10-CM

## 2024-05-15 DIAGNOSIS — Z95.2 S/P AVR (AORTIC VALVE REPLACEMENT): ICD-10-CM

## 2024-05-15 PROCEDURE — G2211 COMPLEX E/M VISIT ADD ON: HCPCS | Performed by: INTERNAL MEDICINE

## 2024-05-15 PROCEDURE — 99213 OFFICE O/P EST LOW 20 MIN: CPT | Performed by: INTERNAL MEDICINE

## 2024-05-15 RX ORDER — ASPIRIN 81 MG/1
81 TABLET ORAL DAILY
COMMUNITY
Start: 2024-05-02

## 2024-05-15 NOTE — PROGRESS NOTES
INTERNAL MEDICINE OFFICE VISIT       NAME: Paulino Dykes  AGE: 89 y.o. SEX: male       : 1933        MRN: [id number]    DATE: 5/15/2024  TIME: 4:07 PM    Assessment and Plan   1. Coronary artery disease involving native coronary artery of native heart without angina pectoris  2. S/P AVR (aortic valve replacement)       Follow-up in 3 months.        Chief Complaint   Follow-up    History of Present Illness   Paulino Dykes is a 89 y.o.-year-old male who feels well.  We reviewed recent elective cardiac catheterization to assess aortic valve function.  This is status post bioprosthetic aortic valve replacement.  Cardiac catheterization showed good valve function but diffuse coronary disease.  There is no angina.  Exercise tolerance is good but there is some dyspnea on exertion.  His catheterization cardiologist is conferring with his general cardiologist regarding course of action.  Apparently, elective PCI has been suggested.    He will need help from his spouses daughter to watch his spouse.  His spouse has advanced dementia.  He is taking great care of her by himself.    Remains incredibly functional at age 89, appearing and acting much younger than his stated age.  He is also handling his responsibilities for his bowels quite well from a psychological standpoint.    Review of Systems   Review of Systems no complaints.  This includes no chest pain, no progressive dyspnea on exertion.  There is some degree of dyspnea on exertion but this is constant, and with moderate activity.    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    Objective     Vitals:    05/15/24 1536   BP: 118/58   Pulse: 73   Temp: 97.6 °F (36.4 °C)   SpO2: 97%     Wt Readings from Last 3 Encounters:   05/15/24 93 kg (205 lb)   03/06/24 94.8 kg (209 lb)   11/15/23 97.1 kg (214 lb)       Physical Exam  Vital signs stable, in good spirits, appears much younger than stated age.  Strength and balance superior.  Cognitive status superior.  Vision and hearing superior.  Gait normal.  Mood optimal.  Lungs clear.  Cardiac: Regular rate and rhythm, classic aortic stenotic murmur heard best over aortic area with preserved aortic component second heart sound.  Classic aortic stenotic murmur heard best over aortic area with preserved aortic component second heart sound.  No other murmur, no S4 or S3.  Peripheral circulation is intact.    Pertinent Laboratory/Diagnostic Studies:        No orders of the defined types were placed in this encounter.      ALLERGIES:  No Known Allergies    Current Medications     Current Outpatient Medications   Medication Sig Dispense Refill    Ascorbic Acid (VITAMIN C PO) Take by mouth in the morning      aspirin (Aspirin Low Dose) 81 mg EC tablet Take 81 mg by mouth daily      ASTAXANTHIN PO Take 4 mg by mouth in the morning 1 or 2 tab per day      Cholecalciferol (VITAMIN D3 PO) Take by mouth in the morning      Coenzyme Q10 (CO Q-10 PO) Take by mouth in the morning      furosemide (LASIX) 20 mg tablet TAKE 1 TABLET BY MOUTH EVERY DAY 30 tablet 1    GRAPE SEED EXTRACT PO Take by mouth in the morning      MAGNESIUM PO Take by mouth daily      Misc Natural Products (DAILY HERBS LEG VEIN/CIRCULATI PO) Take by mouth in the morning VEIN SUPREME      Misc Natural Products (OSTEO BI-FLEX JOINT SHIELD PO) Take by mouth  in the morning      Multiple Vitamins-Minerals (EYE VITAMINS PO) Take by mouth in the morning MACUGUARD      Multiple Vitamins-Minerals (ZINC PO) Take by mouth in the morning      NATTOKINASE PO Take by mouth in the morning      Nutritional Supplements (DHEA PO) Take by mouth in the morning      Probiotic Product (PROBIOTIC PO) Take by mouth in the morning      QUERCETIN PO Take by mouth in the morning      Red Yeast Rice Extract (RED YEAST RICE PO) Take by mouth 2 (two) times a day      RESVERATROL PO Take by mouth in the morning      SELENIUM PO Take by mouth in the morning      spironolactone (ALDACTONE) 25 mg tablet Take 25 mg by mouth daily      Taurine 1000 MG CAPS Take 1,000 mg by mouth in the morning      Thiamine HCl (VITAMIN B-1 PO) Take by mouth daily      TURMERIC CURCUMIN PO Take by mouth in the morning      Xarelto 20 MG tablet Take 20 mg by mouth daily after dinner       No current facility-administered medications for this visit.         Health Maintenance        Rickey Ochoa MD

## 2024-06-27 ENCOUNTER — TELEPHONE (OUTPATIENT)
Dept: INTERNAL MEDICINE CLINIC | Facility: CLINIC | Age: 89
End: 2024-06-27

## 2024-08-27 ENCOUNTER — TELEPHONE (OUTPATIENT)
Age: 89
End: 2024-08-27

## 2024-08-27 NOTE — TELEPHONE ENCOUNTER
Pt's step daughter calling patient was last seen on 8/18/24 in ED for Gastrointestinal hemorrhage. Pt was prescribed upon discharged Rx: Lipitor and developed allergic reaction of Tongue swelling, difficulty speaking and respiratory difficulty yesterday. States symptoms have subsided today. Advised for patient to be further evaluated in ED.      Patient in need of TCM appt.      Please review.  Thank you

## 2024-08-28 NOTE — TELEPHONE ENCOUNTER
I called Don to schedule  the tcm appt but he is in the hospital again and feels he will be going to rehab for a while,  I advised him to call us when he is being discharged for follow up care.

## 2024-10-16 ENCOUNTER — TELEPHONE (OUTPATIENT)
Age: 89
End: 2024-10-16

## 2024-10-16 NOTE — TELEPHONE ENCOUNTER
Patient was going to be discharged today, however patient fell Sunday, nurse will return next week to check up on patient.    No major injuries, vitals are stables, no pain, next to left eye, and some bruising. Nurse advised injuries are superficial to his skin. Nurse advised patient that he should have reported the fall as he is on blood thinners however patient will not leave his wife alone and has no one to take care of her.     Patient reported that he tripped entering the home and fell landing on his knees and elbows. Reported that he did not hit his head, but visible superficial injuries were noted.

## (undated) DEVICE — GLOVE SRG BIOGEL ECLIPSE 7.5

## (undated) DEVICE — DRAPE EQUIPMENT RF WAND

## (undated) DEVICE — ADHESIVE SKIN HIGH VISCOSITY EXOFIN 1ML

## (undated) DEVICE — PLUMEPEN PRO 10FT

## (undated) DEVICE — INTENDED FOR TISSUE SEPARATION, AND OTHER PROCEDURES THAT REQUIRE A SHARP SURGICAL BLADE TO PUNCTURE OR CUT.: Brand: BARD-PARKER SAFETY BLADES SIZE 11, STERILE

## (undated) DEVICE — GLOVE INDICATOR PI UNDERGLOVE SZ 8 BLUE

## (undated) DEVICE — SUT VICRYL 0 UR-6 27 IN J603H

## (undated) DEVICE — CHLORAPREP HI-LITE 26ML ORANGE

## (undated) DEVICE — 3000CC GUARDIAN II: Brand: GUARDIAN

## (undated) DEVICE — JP CHAN DRN SIL HUBLESS 15FR W/TRO: Brand: CARDINAL HEALTH

## (undated) DEVICE — TUBING SMOKE EVAC W/FILTRATION DEVICE PLUMEPORT ACTIV

## (undated) DEVICE — LIGAMAX 5 MM ENDOSCOPIC MULTIPLE CLIP APPLIER: Brand: LIGAMAX

## (undated) DEVICE — GLOVE INDICATOR PI UNDERGLOVE SZ 6.5 BLUE

## (undated) DEVICE — IRRIG ENDO FLO TUBING

## (undated) DEVICE — SCD SEQUENTIAL COMPRESSION COMFORT SLEEVE MEDIUM KNEE LENGTH: Brand: KENDALL SCD

## (undated) DEVICE — TROCAR: Brand: KII FIOS FIRST ENTRY

## (undated) DEVICE — PDS II VLT 0 107CM AG ST3: Brand: ENDOLOOP

## (undated) DEVICE — [HIGH FLOW INSUFFLATOR,  DO NOT USE IF PACKAGE IS DAMAGED,  KEEP DRY,  KEEP AWAY FROM SUNLIGHT,  PROTECT FROM HEAT AND RADIOACTIVE SOURCES.]: Brand: PNEUMOSURE

## (undated) DEVICE — SURGICEL 4 X 8

## (undated) DEVICE — ALLENTOWN LAP CHOLE APP PACK: Brand: CARDINAL HEALTH

## (undated) DEVICE — ENDOPATH 5MM CURVED SCISSORS WITH MONOPOLAR CAUTERY: Brand: ENDOPATH

## (undated) DEVICE — TISSUE RETRIEVAL SYSTEM: Brand: INZII RETRIEVAL SYSTEM

## (undated) DEVICE — SUT MONOCRYL 4-0 PS-2 27 IN Y426H

## (undated) DEVICE — ELECTRODE LAP J HOOK SPLIT STEM E-Z CLEAN 33CM -0021S

## (undated) DEVICE — JACKSON-PRATT 100CC BULB RESERVOIR: Brand: CARDINAL HEALTH

## (undated) DEVICE — GLOVE SRG BIOGEL 6.5

## (undated) DEVICE — PMI DISPOSABLE PUNCTURE CLOSURE DEVICE / SUTURE GRASPER: Brand: PMI

## (undated) DEVICE — TROCAR: Brand: KII SLEEVE

## (undated) DEVICE — BLUE HEAT SCOPE WARMER

## (undated) DEVICE — SUT ETHILON 2-0 FSLX 30 IN 1674H